# Patient Record
Sex: FEMALE | Race: BLACK OR AFRICAN AMERICAN | Employment: FULL TIME | URBAN - METROPOLITAN AREA
[De-identification: names, ages, dates, MRNs, and addresses within clinical notes are randomized per-mention and may not be internally consistent; named-entity substitution may affect disease eponyms.]

---

## 2022-10-27 ENCOUNTER — APPOINTMENT (OUTPATIENT)
Dept: GENERAL RADIOLOGY | Facility: HOSPITAL | Age: 22
End: 2022-10-27
Attending: NURSE PRACTITIONER
Payer: MEDICAID

## 2022-10-27 ENCOUNTER — HOSPITAL ENCOUNTER (EMERGENCY)
Facility: HOSPITAL | Age: 22
Discharge: HOME | End: 2022-10-28
Payer: MEDICAID

## 2022-10-27 VITALS
RESPIRATION RATE: 18 BRPM | DIASTOLIC BLOOD PRESSURE: 88 MMHG | TEMPERATURE: 98 F | HEIGHT: 62 IN | BODY MASS INDEX: 42.33 KG/M2 | WEIGHT: 230 LBS | OXYGEN SATURATION: 100 % | HEART RATE: 86 BPM | SYSTOLIC BLOOD PRESSURE: 151 MMHG

## 2022-10-27 DIAGNOSIS — S70.02XA CONTUSION OF LEFT HIP, INITIAL ENCOUNTER: Primary | ICD-10-CM

## 2022-10-27 PROCEDURE — 99283 EMERGENCY DEPT VISIT LOW MDM: CPT

## 2022-10-27 PROCEDURE — 73502 X-RAY EXAM HIP UNI 2-3 VIEWS: CPT | Performed by: NURSE PRACTITIONER

## 2022-10-27 PROCEDURE — 99284 EMERGENCY DEPT VISIT MOD MDM: CPT

## 2022-10-28 RX ORDER — TRAMADOL HYDROCHLORIDE 50 MG/1
TABLET ORAL EVERY 6 HOURS PRN
Qty: 10 TABLET | Refills: 0 | Status: SHIPPED | OUTPATIENT
Start: 2022-10-28 | End: 2022-11-02
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
## 2024-07-09 ENCOUNTER — HOSPITAL ENCOUNTER (EMERGENCY)
Facility: HOSPITAL | Age: 24
Discharge: HOME | End: 2024-07-09

## 2024-07-09 VITALS
RESPIRATION RATE: 16 BRPM | BODY MASS INDEX: 41 KG/M2 | SYSTOLIC BLOOD PRESSURE: 133 MMHG | HEART RATE: 70 BPM | OXYGEN SATURATION: 100 % | TEMPERATURE: 98 F | DIASTOLIC BLOOD PRESSURE: 66 MMHG | WEIGHT: 225 LBS

## 2024-07-09 DIAGNOSIS — R11.2 NAUSEA AND VOMITING IN ADULT: Primary | ICD-10-CM

## 2024-07-09 LAB
ALBUMIN SERPL-MCNC: 5.3 G/DL (ref 3.2–4.8)
ALBUMIN/GLOB SERPL: 1.4 {RATIO} (ref 1–2)
ALP LIVER SERPL-CCNC: 74 U/L
ALT SERPL-CCNC: 8 U/L
ANION GAP SERPL CALC-SCNC: 6 MMOL/L (ref 0–18)
AST SERPL-CCNC: 20 U/L (ref ?–34)
ATRIAL RATE: 59 BPM
B-HCG UR QL: NEGATIVE
BASOPHILS # BLD AUTO: 0.01 X10(3) UL (ref 0–0.2)
BASOPHILS NFR BLD AUTO: 0.1 %
BILIRUB SERPL-MCNC: 0.5 MG/DL (ref 0.3–1.2)
BUN BLD-MCNC: 8 MG/DL (ref 9–23)
BUN/CREAT SERPL: 11.8 (ref 10–20)
CALCIUM BLD-MCNC: 10.3 MG/DL (ref 8.7–10.4)
CHLORIDE SERPL-SCNC: 111 MMOL/L (ref 98–112)
CO2 SERPL-SCNC: 22 MMOL/L (ref 21–32)
CREAT BLD-MCNC: 0.68 MG/DL
DEPRECATED RDW RBC AUTO: 48.4 FL (ref 35.1–46.3)
EGFRCR SERPLBLD CKD-EPI 2021: 123 ML/MIN/1.73M2 (ref 60–?)
EOSINOPHIL # BLD AUTO: 0 X10(3) UL (ref 0–0.7)
EOSINOPHIL NFR BLD AUTO: 0 %
ERYTHROCYTE [DISTWIDTH] IN BLOOD BY AUTOMATED COUNT: 21.2 % (ref 11–15)
GLOBULIN PLAS-MCNC: 3.7 G/DL (ref 2–3.5)
GLUCOSE BLD-MCNC: 108 MG/DL (ref 70–99)
HCT VFR BLD AUTO: 25.8 %
HGB BLD-MCNC: 7.1 G/DL
IMM GRANULOCYTES # BLD AUTO: 0.04 X10(3) UL (ref 0–1)
IMM GRANULOCYTES NFR BLD: 0.5 %
LIPASE SERPL-CCNC: 25 U/L (ref 13–75)
LYMPHOCYTES # BLD AUTO: 0.59 X10(3) UL (ref 1–4)
LYMPHOCYTES NFR BLD AUTO: 8 %
MCH RBC QN AUTO: 17.7 PG (ref 26–34)
MCHC RBC AUTO-ENTMCNC: 27.5 G/DL (ref 31–37)
MCV RBC AUTO: 64.3 FL
MONOCYTES # BLD AUTO: 0.35 X10(3) UL (ref 0.1–1)
MONOCYTES NFR BLD AUTO: 4.7 %
NEUTROPHILS # BLD AUTO: 6.42 X10 (3) UL (ref 1.5–7.7)
NEUTROPHILS # BLD AUTO: 6.42 X10(3) UL (ref 1.5–7.7)
NEUTROPHILS NFR BLD AUTO: 86.7 %
OSMOLALITY SERPL CALC.SUM OF ELEC: 287 MOSM/KG (ref 275–295)
P AXIS: -4 DEGREES
P-R INTERVAL: 134 MS
PLATELET # BLD AUTO: 122 10(3)UL (ref 150–450)
PLATELETS.RETICULATED NFR BLD AUTO: 25.9 % (ref 0–7)
POTASSIUM SERPL-SCNC: 3.7 MMOL/L (ref 3.5–5.1)
PROT SERPL-MCNC: 9 G/DL (ref 5.7–8.2)
Q-T INTERVAL: 426 MS
QRS DURATION: 94 MS
QTC CALCULATION (BEZET): 421 MS
R AXIS: 44 DEGREES
RBC # BLD AUTO: 4.01 X10(6)UL
SODIUM SERPL-SCNC: 139 MMOL/L (ref 136–145)
T AXIS: 36 DEGREES
VENTRICULAR RATE: 59 BPM
WBC # BLD AUTO: 7.4 X10(3) UL (ref 4–11)

## 2024-07-09 PROCEDURE — 96374 THER/PROPH/DIAG INJ IV PUSH: CPT

## 2024-07-09 PROCEDURE — 83690 ASSAY OF LIPASE: CPT

## 2024-07-09 PROCEDURE — 85025 COMPLETE CBC W/AUTO DIFF WBC: CPT

## 2024-07-09 PROCEDURE — 96375 TX/PRO/DX INJ NEW DRUG ADDON: CPT

## 2024-07-09 PROCEDURE — 93010 ELECTROCARDIOGRAM REPORT: CPT

## 2024-07-09 PROCEDURE — 81025 URINE PREGNANCY TEST: CPT

## 2024-07-09 PROCEDURE — 96361 HYDRATE IV INFUSION ADD-ON: CPT

## 2024-07-09 PROCEDURE — 93005 ELECTROCARDIOGRAM TRACING: CPT

## 2024-07-09 PROCEDURE — 85060 BLOOD SMEAR INTERPRETATION: CPT

## 2024-07-09 PROCEDURE — 99284 EMERGENCY DEPT VISIT MOD MDM: CPT

## 2024-07-09 PROCEDURE — 96376 TX/PRO/DX INJ SAME DRUG ADON: CPT

## 2024-07-09 PROCEDURE — 80053 COMPREHEN METABOLIC PANEL: CPT

## 2024-07-09 RX ORDER — METOCLOPRAMIDE HYDROCHLORIDE 5 MG/ML
10 INJECTION INTRAMUSCULAR; INTRAVENOUS ONCE
Status: COMPLETED | OUTPATIENT
Start: 2024-07-09 | End: 2024-07-09

## 2024-07-09 RX ORDER — FAMOTIDINE 20 MG/1
20 TABLET, FILM COATED ORAL 2 TIMES DAILY PRN
Qty: 30 TABLET | Refills: 0 | Status: SHIPPED | OUTPATIENT
Start: 2024-07-09 | End: 2024-08-08

## 2024-07-09 RX ORDER — ONDANSETRON 4 MG/1
4 TABLET, ORALLY DISINTEGRATING ORAL EVERY 4 HOURS PRN
Qty: 10 TABLET | Refills: 0 | Status: SHIPPED | OUTPATIENT
Start: 2024-07-09 | End: 2024-07-16

## 2024-07-09 RX ORDER — ONDANSETRON 2 MG/ML
4 INJECTION INTRAMUSCULAR; INTRAVENOUS ONCE
Status: COMPLETED | OUTPATIENT
Start: 2024-07-09 | End: 2024-07-09
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
## 2024-07-09 NOTE — DISCHARGE INSTRUCTIONS
You will need to start taking your iron and call your doctor for a recheck of your complete blood count

## 2024-11-02 ENCOUNTER — APPOINTMENT (OUTPATIENT)
Dept: ULTRASOUND IMAGING | Facility: HOSPITAL | Age: 24
End: 2024-11-02
Attending: EMERGENCY MEDICINE

## 2024-11-02 ENCOUNTER — HOSPITAL ENCOUNTER (EMERGENCY)
Facility: HOSPITAL | Age: 24
Discharge: HOME | End: 2024-11-02
Attending: EMERGENCY MEDICINE

## 2024-11-02 VITALS
SYSTOLIC BLOOD PRESSURE: 113 MMHG | BODY MASS INDEX: 42.29 KG/M2 | WEIGHT: 224 LBS | TEMPERATURE: 99 F | HEART RATE: 93 BPM | DIASTOLIC BLOOD PRESSURE: 64 MMHG | RESPIRATION RATE: 16 BRPM | HEIGHT: 61 IN | OXYGEN SATURATION: 100 %

## 2024-11-02 DIAGNOSIS — Z3A.01 LESS THAN 8 WEEKS GESTATION OF PREGNANCY (HCC): ICD-10-CM

## 2024-11-02 DIAGNOSIS — D64.9 ANEMIA, UNSPECIFIED TYPE: Primary | ICD-10-CM

## 2024-11-02 DIAGNOSIS — D69.6 THROMBOCYTOPENIA (HCC): ICD-10-CM

## 2024-11-02 LAB
ALBUMIN SERPL-MCNC: 4.4 G/DL (ref 3.2–4.8)
ALBUMIN/GLOB SERPL: 1.5 {RATIO} (ref 1–2)
ALP LIVER SERPL-CCNC: 62 U/L
ALT SERPL-CCNC: 12 U/L
ANION GAP SERPL CALC-SCNC: 7 MMOL/L (ref 0–18)
ANTIBODY SCREEN: NEGATIVE
AST SERPL-CCNC: 17 U/L (ref ?–34)
B-HCG SERPL-ACNC: ABNORMAL MIU/ML
B-HCG UR QL: POSITIVE
BASOPHILS # BLD AUTO: 0.03 X10(3) UL (ref 0–0.2)
BASOPHILS NFR BLD AUTO: 0.5 %
BILIRUB SERPL-MCNC: 0.3 MG/DL (ref 0.3–1.2)
BILIRUB UR QL: NEGATIVE
BUN BLD-MCNC: 8 MG/DL (ref 9–23)
BUN/CREAT SERPL: 13.1 (ref 10–20)
CALCIUM BLD-MCNC: 9.4 MG/DL (ref 8.7–10.4)
CHLORIDE SERPL-SCNC: 107 MMOL/L (ref 98–112)
CO2 SERPL-SCNC: 24 MMOL/L (ref 21–32)
COLOR UR: YELLOW
CREAT BLD-MCNC: 0.61 MG/DL
DEPRECATED HBV CORE AB SER IA-ACNC: 2 NG/ML
DEPRECATED RDW RBC AUTO: 43.2 FL (ref 35.1–46.3)
EGFRCR SERPLBLD CKD-EPI 2021: 126 ML/MIN/1.73M2 (ref 60–?)
EOSINOPHIL # BLD AUTO: 0.18 X10(3) UL (ref 0–0.7)
EOSINOPHIL NFR BLD AUTO: 3.3 %
ERYTHROCYTE [DISTWIDTH] IN BLOOD BY AUTOMATED COUNT: 19.1 % (ref 11–15)
FLUAV + FLUBV RNA SPEC NAA+PROBE: NEGATIVE
FLUAV + FLUBV RNA SPEC NAA+PROBE: NEGATIVE
GLOBULIN PLAS-MCNC: 3 G/DL (ref 2–3.5)
GLUCOSE BLD-MCNC: 97 MG/DL (ref 70–99)
GLUCOSE UR-MCNC: NORMAL MG/DL
HCT VFR BLD AUTO: 23.5 %
HGB BLD-MCNC: 6.2 G/DL
HGB UR QL STRIP.AUTO: NEGATIVE
IMM GRANULOCYTES # BLD AUTO: 0.02 X10(3) UL (ref 0–1)
IMM GRANULOCYTES NFR BLD: 0.4 %
IRON SATN MFR SERPL: 2 %
IRON SERPL-MCNC: 9 UG/DL
KETONES UR-MCNC: NEGATIVE MG/DL
LEUKOCYTE ESTERASE UR QL STRIP.AUTO: 500
LYMPHOCYTES # BLD AUTO: 1.57 X10(3) UL (ref 1–4)
LYMPHOCYTES NFR BLD AUTO: 28.6 %
MCH RBC QN AUTO: 16.8 PG (ref 26–34)
MCHC RBC AUTO-ENTMCNC: 26.4 G/DL (ref 31–37)
MCV RBC AUTO: 63.5 FL
MONOCYTES # BLD AUTO: 0.78 X10(3) UL (ref 0.1–1)
MONOCYTES NFR BLD AUTO: 14.2 %
NEUTROPHILS # BLD AUTO: 2.91 X10 (3) UL (ref 1.5–7.7)
NEUTROPHILS # BLD AUTO: 2.91 X10(3) UL (ref 1.5–7.7)
NEUTROPHILS NFR BLD AUTO: 53 %
NITRITE UR QL STRIP.AUTO: NEGATIVE
OSMOLALITY SERPL CALC.SUM OF ELEC: 284 MOSM/KG (ref 275–295)
PH UR: 6 [PH] (ref 5–8)
PLATELET # BLD AUTO: 105 10(3)UL (ref 150–450)
PLATELETS.RETICULATED NFR BLD AUTO: 19.4 % (ref 0–7)
POTASSIUM SERPL-SCNC: 4 MMOL/L (ref 3.5–5.1)
PROT SERPL-MCNC: 7.4 G/DL (ref 5.7–8.2)
PROT UR-MCNC: 30 MG/DL
RBC # BLD AUTO: 3.7 X10(6)UL
RH BLOOD TYPE: NEGATIVE
RH BLOOD TYPE: NEGATIVE
RSV RNA SPEC NAA+PROBE: NEGATIVE
SARS-COV-2 RNA RESP QL NAA+PROBE: NOT DETECTED
SODIUM SERPL-SCNC: 138 MMOL/L (ref 136–145)
SP GR UR STRIP: 1.02 (ref 1–1.03)
TIBC SERPL-MCNC: 599 UG/DL (ref 250–425)
TRANSFERRIN SERPL-MCNC: 402 MG/DL (ref 250–380)
UROBILINOGEN UR STRIP-ACNC: NORMAL
WBC # BLD AUTO: 5.5 X10(3) UL (ref 4–11)

## 2024-11-02 PROCEDURE — 85025 COMPLETE CBC W/AUTO DIFF WBC: CPT | Performed by: EMERGENCY MEDICINE

## 2024-11-02 PROCEDURE — 81001 URINALYSIS AUTO W/SCOPE: CPT | Performed by: EMERGENCY MEDICINE

## 2024-11-02 PROCEDURE — 99284 EMERGENCY DEPT VISIT MOD MDM: CPT

## 2024-11-02 PROCEDURE — 83540 ASSAY OF IRON: CPT | Performed by: EMERGENCY MEDICINE

## 2024-11-02 PROCEDURE — 86850 RBC ANTIBODY SCREEN: CPT | Performed by: EMERGENCY MEDICINE

## 2024-11-02 PROCEDURE — 82728 ASSAY OF FERRITIN: CPT | Performed by: EMERGENCY MEDICINE

## 2024-11-02 PROCEDURE — 86900 BLOOD TYPING SEROLOGIC ABO: CPT | Performed by: EMERGENCY MEDICINE

## 2024-11-02 PROCEDURE — 87086 URINE CULTURE/COLONY COUNT: CPT | Performed by: EMERGENCY MEDICINE

## 2024-11-02 PROCEDURE — 0241U SARS-COV-2/FLU A AND B/RSV BY PCR (GENEXPERT): CPT | Performed by: EMERGENCY MEDICINE

## 2024-11-02 PROCEDURE — 96360 HYDRATION IV INFUSION INIT: CPT

## 2024-11-02 PROCEDURE — 84702 CHORIONIC GONADOTROPIN TEST: CPT | Performed by: EMERGENCY MEDICINE

## 2024-11-02 PROCEDURE — 76817 TRANSVAGINAL US OBSTETRIC: CPT | Performed by: EMERGENCY MEDICINE

## 2024-11-02 PROCEDURE — 76801 OB US < 14 WKS SINGLE FETUS: CPT | Performed by: EMERGENCY MEDICINE

## 2024-11-02 PROCEDURE — 80053 COMPREHEN METABOLIC PANEL: CPT | Performed by: EMERGENCY MEDICINE

## 2024-11-02 PROCEDURE — 84466 ASSAY OF TRANSFERRIN: CPT | Performed by: EMERGENCY MEDICINE

## 2024-11-02 PROCEDURE — 85060 BLOOD SMEAR INTERPRETATION: CPT | Performed by: EMERGENCY MEDICINE

## 2024-11-02 PROCEDURE — 86901 BLOOD TYPING SEROLOGIC RH(D): CPT | Performed by: EMERGENCY MEDICINE

## 2024-11-02 PROCEDURE — 81025 URINE PREGNANCY TEST: CPT

## 2024-11-02 RX ORDER — METOCLOPRAMIDE HYDROCHLORIDE 5 MG/ML
10 INJECTION INTRAMUSCULAR; INTRAVENOUS ONCE
Status: DISCONTINUED | OUTPATIENT
Start: 2024-11-02 | End: 2024-11-02

## 2024-11-02 RX ORDER — PRENATAL VIT/IRON FUM/FOLIC AC 27MG-0.8MG
1 TABLET ORAL DAILY
Qty: 30 TABLET | Refills: 0 | Status: SHIPPED | OUTPATIENT
Start: 2024-11-02 | End: 2024-12-02

## 2024-11-02 RX ORDER — FERROUS SULFATE 325(65) MG
325 TABLET, DELAYED RELEASE (ENTERIC COATED) ORAL 2 TIMES DAILY WITH MEALS
Qty: 180 TABLET | Refills: 0 | Status: SHIPPED | OUTPATIENT
Start: 2024-11-02 | End: 2025-01-31

## 2024-11-02 RX ORDER — DIPHENHYDRAMINE HYDROCHLORIDE 50 MG/ML
25 INJECTION INTRAMUSCULAR; INTRAVENOUS ONCE
Status: DISCONTINUED | OUTPATIENT
Start: 2024-11-02 | End: 2024-11-02

## 2024-11-02 RX ORDER — ACETAMINOPHEN 325 MG/1
650 TABLET ORAL ONCE
Status: COMPLETED | OUTPATIENT
Start: 2024-11-02 | End: 2024-11-02

## 2024-11-02 NOTE — ED PROVIDER NOTES
Patient Seen in: [facility] Emergency Department      History     Chief Complaint   Patient presents with    Abdomen/Flank Pain     Stated Complaint: abd pain    Subjective:   HPI      24-year-old female presents for evaluation for headache, congestion, dry mouth for the past two weeks as well as lower abdominal cramping radiating to the back for the past two weeks.  She is unsure when her last menstrual cycle was.  She does have mild cough. Headache was not thunderclap in nature.  It is frontal pressure like, made worse with bright lights and looking at computer monitors at work.  She denies fevers, chills, nausea, vomiting, diarrhea, constipation, chest pain, shortness of breath, vaginal bleeding or discharge, dysuria or hematuria.      Objective:     History reviewed. No pertinent past medical history.           History reviewed. No pertinent surgical history.             Social History     Socioeconomic History    Marital status: Single   Tobacco Use    Smoking status: Never    Smokeless tobacco: Never   Vaping Use    Vaping status: Never Used   Substance and Sexual Activity    Alcohol use: Yes     Comment: social    Drug use: Never                  Physical Exam     ED Triage Vitals [11/02/24 0937]   /60   Pulse 98   Resp 16   Temp 98.6 °F (37 °C)   Temp src    SpO2 95 %   O2 Device None (Room air)       Current Vitals:   Vital Signs  BP: 113/64  Pulse: 93  Resp: 16  Temp: 98.6 °F (37 °C)  MAP (mmHg): 79    Oxygen Therapy  SpO2: 100 %  O2 Device: None (Room air)        Physical Exam  Vitals and nursing note reviewed.   Constitutional:       General: She is not in acute distress.     Appearance: Normal appearance.   HENT:      Head: Normocephalic and atraumatic.      Jaw: No trismus.      Right Ear: No mastoid tenderness.      Left Ear: No mastoid tenderness.      Nose: Congestion present. No nasal deformity.      Right Nostril: No epistaxis.      Left Nostril: No epistaxis.      Mouth/Throat:       Lips: Pink.      Mouth: Mucous membranes are moist. No angioedema.      Pharynx: Oropharynx is clear. Uvula midline. No oropharyngeal exudate, posterior oropharyngeal erythema or uvula swelling.      Tonsils: No tonsillar exudate or tonsillar abscesses.   Eyes:      General: Lids are normal.      Extraocular Movements: Extraocular movements intact.      Conjunctiva/sclera: Conjunctivae normal.      Pupils: Pupils are equal, round, and reactive to light.   Cardiovascular:      Rate and Rhythm: Normal rate and regular rhythm.      Pulses: Normal pulses.      Heart sounds: Normal heart sounds.   Pulmonary:      Effort: Pulmonary effort is normal. No respiratory distress.      Breath sounds: Normal breath sounds and air entry.   Abdominal:      General: Bowel sounds are normal.      Palpations: Abdomen is soft.      Tenderness: There is no abdominal tenderness. There is no guarding or rebound.   Musculoskeletal:         General: No deformity. Normal range of motion.      Cervical back: Normal range of motion. No rigidity.   Skin:     General: Skin is warm and dry.      Coloration: Skin is not cyanotic.   Neurological:      General: No focal deficit present.      Mental Status: She is alert. Mental status is at baseline.      Motor: Motor function is intact.      Gait: Gait is intact.   Psychiatric:         Attention and Perception: Attention normal.         Mood and Affect: Mood normal.         Speech: Speech normal.             ED Course     Labs Reviewed   URINALYSIS WITH CULTURE REFLEX - Abnormal; Notable for the following components:       Result Value    Clarity Urine Ex.Turbid (*)     Protein Urine 30 (*)     Leukocyte Esterase Urine 500 (*)     WBC Urine 6-10 (*)     RBC Urine 6-10 (*)     Squamous Epi. Cells Many (*)     All other components within normal limits   CBC WITH DIFFERENTIAL WITH PLATELET - Abnormal; Notable for the following components:    RBC 3.70 (*)     HGB 6.2 (*)     HCT 23.5 (*)     MCV 63.5  (*)     MCH 16.8 (*)     MCHC 26.4 (*)     RDW 19.1 (*)     .0 (*)     Immature Platelet Fraction 19.4 (*)     All other components within normal limits   COMP METABOLIC PANEL (14) - Abnormal; Notable for the following components:    BUN 8 (*)     All other components within normal limits   HCG, BETA SUBUNIT (QUANT PREGNANCY TEST) - Abnormal; Notable for the following components:    Hcg Quantitative 16,462.6 (*)     All other components within normal limits   IRON AND TIBC - Abnormal; Notable for the following components:    Iron 9 (*)     Transferrin 402 (*)     Total Iron Binding Capacity 599 (*)     % Saturation 2 (*)     All other components within normal limits   FERRITIN - Abnormal; Notable for the following components:    Ferritin 2 (*)     All other components within normal limits   RBC MORPHOLOGY SCAN - Abnormal; Notable for the following components:    RBC Morphology See morphology below (*)     Platelet Morphology See morphology below (*)     Giant platelets Few (*)     All other components within normal limits   POCT PREGNANCY URINE - Abnormal; Notable for the following components:    POCT Urine Pregnancy Positive (*)     All other components within normal limits   SARS-COV-2/FLU A AND B/RSV BY PCR ([employer]) - Normal    Narrative:     This test is intended for the qualitative detection and differentiation of SARS-CoV-2, influenza A, influenza B, and respiratory syncytial virus (RSV) viral RNA in nasopharyngeal or nares swabs from individuals suspected of respiratory viral infection consistent with COVID-19 by their healthcare provider. Signs and symptoms of respiratory viral infection due to SARS-CoV-2, influenza, and RSV can be similar.    Test performed using the Xpert Xpress SARS-CoV-2/FLU/RSV (real time RT-PCR)  assay on the GeneXpert instrument, [employer], [place] [address].   This test is being used under the Food and Drug Administration's Emergency Use Authorization.    The authorized  Fact Sheet for Healthcare Providers for this assay is available upon request from the laboratory.   SCAN SLIDE   MD BLOOD SMEAR CONSULT   TYPE AND SCREEN    Narrative:     The following orders were created for panel order Type and screen.  Procedure                               Abnormality         Status                     ---------                               -----------         ------                     ABORH (Blood Type)[[id number]]                               Final result               Antibody Screen[[id number]]                                  Final result                 Please view results for these tests on the individual orders.   ABORH (BLOOD TYPE)   ANTIBODY SCREEN   ABORH CONFIRMATION   RAINBOW DRAW LAVENDER   RAINBOW DRAW LIGHT GREEN   RAINBOW DRAW BLUE   URINE CULTURE, ROUTINE       ED Course as of 11/02/24 1545  ------------------------------------------------------------  Time: 11/02 1154  Value: US PREG 1ST TRIM W/EV (CPT=76801/94229)  Comment: Per my independent interpretation, patient's US Pregnancy demonstrates intrauterine gestation.                MDM              Medical Decision Making  Differential diagnosis includes but is not limited to sinusitis, viral syndrome, allergies, migraine, tension headache, etc    Headache was gradual in onset and not worst of life, I do not suspect subarachnoid hemorrhage at this time. There is no fever or meningismus to suggest meningitis. There are no neurologic deficits, I do not suspect any intracranial mass at this time.  Patient's pregnancy test was positive, given abdominal pain, US obtained and demonstrates live intrauterine gestation.  Patient's CBC shows anemia and thrombocytopenia which was discussed with patient and advised need for further workup to determine etiology.  She denies heavy menses, melena and hematochezia.   Blood transfusion also offered and declined, she would like to start iron supplementation instead.  Rx for iron and  prenatals provided with OB follow up recommended as well.  She is discharged home with return precautions.     Medical Record Review: I personally reviewed available prior medical records for any recent pertinent discharge summaries, testing, and procedures, and reviewed those reports.    Complicating factors: The patient  has no past medical history on file. and  has no past surgical history on file. that contribute to the medical complexity of this ED evaluation.     Clinical impression as well as any lab results and radiology findings were discussed with the patient and/or caregiver. I personally reviewed all laboratory results and radiology images myself. Patient is advised to follow up with PCP for reevaluation. I provided discharge instructions and return precautions. Patient and/or caregiver voices understanding and agreement with the treatment plan. All questions were addressed and answered.             Problems Addressed:  Anemia, unspecified type: chronic illness or injury with severe exacerbation, progression, or side effects of treatment  Less than 8 weeks gestation of pregnancy (HCC): acute illness or injury  Thrombocytopenia (HCC): undiagnosed new problem with uncertain prognosis    Amount and/or Complexity of Data Reviewed  External Data Reviewed: labs.     Details: Labs on 7/9/24 reviewed, Hgb 7.1, Plt 122  Labs: ordered. Decision-making details documented in ED Course.  Radiology: ordered and independent interpretation performed. Decision-making details documented in ED Course.    Risk  Prescription drug management.        Disposition and Plan     Clinical Impression:  1. Anemia, unspecified type    2. Thrombocytopenia (HCC)    3. Less than 8 weeks gestation of pregnancy (HCC)         Disposition:  Discharge  11/2/2024 11:52 am    Follow-up:  Nancy Oneill MD  [address] [address]  [phone]    Call  To schedule follow up and re-evaluation          Medications  Prescribed:  Discharge Medication List as of 11/2/2024 11:56 AM        START taking these medications    Details   ferrous sulfate 325 (65 FE) MG Oral Tab EC Take 1 tablet (325 mg total) by mouth 2 (two) times daily with meals., Normal, Disp-180 tablet, R-0      Prenatal 27-0.8 MG Oral Tab Take 1 tablet by mouth daily., Normal, Disp-30 tablet, R-0                 Supplementary Documentation:

## 2024-11-02 NOTE — ED INITIAL ASSESSMENT (HPI)
To ED with c/o abd pain and lower back pain for about 2 weeks. Denies N/V/D. Patient also c/o migraine and cotton mouth.

## 2024-12-18 ENCOUNTER — TELEPHONE (OUTPATIENT)
Dept: OBGYN CLINIC | Facility: CLINIC | Age: 24
End: 2024-12-18

## 2024-12-18 NOTE — TELEPHONE ENCOUNTER
Patient calling with +HPT. LMP was 9/20/24. Cycles are regular, 28d.     No prenatal care so far, but seen in ER.   11/2/24 ultrasound dating 7w0d, making her 13w4d today.     Patient agrees to policy to rotate care between all providers in the office, and understands that on-call provider will deliver her.   Patient directed to start PNV with iron, DHA and folic acid.     HT 5'1\"   lb    OBN appt scheduled on 1/11/25. She is going to consider seeking care sooner elsewhere, but will let us know if she would like to cancel OB Nurse Education.

## 2024-12-26 ENCOUNTER — TELEPHONE (OUTPATIENT)
Dept: OBGYN CLINIC | Facility: CLINIC | Age: 24
End: 2024-12-26

## 2024-12-26 NOTE — TELEPHONE ENCOUNTER
Lmp 9/20/24 patient concerned in delay with [facility] group scheduling nurse Ed on 1/11.  Patient hoping to get in sooner and get ultrasound scheduled.    Pls advise

## 2024-12-30 ENCOUNTER — INITIAL PRENATAL (OUTPATIENT)
Dept: OBGYN CLINIC | Facility: CLINIC | Age: 24
End: 2024-12-30

## 2024-12-30 VITALS
WEIGHT: 226 LBS | BODY MASS INDEX: 43 KG/M2 | DIASTOLIC BLOOD PRESSURE: 70 MMHG | SYSTOLIC BLOOD PRESSURE: 118 MMHG | HEART RATE: 96 BPM

## 2024-12-30 DIAGNOSIS — Z34.92 NORMAL PREGNANCY IN SECOND TRIMESTER (HCC): Primary | ICD-10-CM

## 2024-12-30 PROCEDURE — 0500F INITIAL PRENATAL CARE VISIT: CPT | Performed by: OBSTETRICS & GYNECOLOGY

## 2024-12-30 RX ORDER — CALCIUM CARBONATE 500(1250)
1000 TABLET ORAL DAILY
Qty: 120 TABLET | Refills: 2 | Status: SHIPPED | OUTPATIENT
Start: 2024-12-30 | End: 2025-02-28

## 2024-12-30 RX ORDER — CHOLECALCIFEROL (VITAMIN D3) 25 MCG
1 TABLET,CHEWABLE ORAL DAILY
COMMUNITY

## 2024-12-30 RX ORDER — FOLIC ACID 0.4 MG
400 TABLET ORAL DAILY
COMMUNITY

## 2024-12-30 RX ORDER — PNV NO.95/FERROUS FUM/FOLIC AC 28MG-0.8MG
1 TABLET ORAL DAILY
Qty: 90 TABLET | Refills: 2 | Status: SHIPPED | OUTPATIENT
Start: 2024-12-30 | End: 2025-03-30

## 2024-12-30 RX ORDER — MULTIVIT-MIN/IRON/FOLIC ACID/K 18-600-40
1 CAPSULE ORAL DAILY
Qty: 90 CAPSULE | Refills: 2 | Status: SHIPPED | OUTPATIENT
Start: 2024-12-30 | End: 2025-09-26

## 2024-12-30 NOTE — PROGRESS NOTES
Mesha Cavanaugh is a 24 year old female  No LMP recorded (lmp unknown). Patient is pregnant.   Chief Complaint   Patient presents with    Prenatal Care     New OB       OBSTETRICS HISTORY:     OB History    Para Term  AB Living   1             SAB IAB Ectopic Multiple Live Births                  # Outcome Date GA Lbr Gerardo/2nd Weight Sex Type Anes PTL Lv   1 Current                GYNE HISTORY:         No data recorded       No data to display                  MEDICAL HISTORY:     History reviewed. No pertinent past medical history.    SURGICAL HISTORY:     Past Surgical History:   Procedure Laterality Date    Hip surgery         SOCIAL HISTORY:     Social History     Socioeconomic History    Marital status: Single   Tobacco Use    Smoking status: Never    Smokeless tobacco: Never   Vaping Use    Vaping status: Never Used   Substance and Sexual Activity    Alcohol use: Not Currently     Comment: social    Drug use: Never        FAMILY HISTORY:     History reviewed. No pertinent family history.    MEDICATIONS:       Current Outpatient Medications:     folic acid 400 MCG Oral Tab, Take 1 tablet (400 mcg total) by mouth daily., Disp: , Rfl:     prenatal vitamin with DHA 27-0.8-228 MG Oral Cap, Take 1 capsule by mouth daily., Disp: , Rfl:     Prenatal Vit-Fe Fumarate-FA (PRENATAL VITAMINS) 28-0.8 MG Oral Tab, Take 1 tablet by mouth daily., Disp: 90 tablet, Rfl: 2    Calcium 500 MG Oral Tab, Take 1,000 mg by mouth daily for 60 doses., Disp: 120 tablet, Rfl: 2    Cholecalciferol (VITAMIN D) 50 MCG (2000 UT) Oral Cap, Take 1 capsule (2,000 Units total) by mouth daily., Disp: 90 capsule, Rfl: 2    Aspirin 81 MG Oral Cap, Take 2 tablets by mouth daily., Disp: 90 capsule, Rfl: 2    ferrous sulfate 325 (65 FE) MG Oral Tab EC, Take 1 tablet (325 mg total) by mouth 2 (two) times daily with meals. (Patient not taking: Reported on 2024), Disp: 180 tablet, Rfl: 0    ALLERGIES:      Allergies[1]      REVIEW OF SYSTEMS:     Constitutional:    denies fever / chills  Eyes:     denies blurred or double vision  Cardiovascular:  denies chest pain or palpitations  Respiratory:    denies shortness of breath  Gastrointestinal:  denies severe abdominal pain, frequent diarrhea or constipation, nausea / vomiting  Genitourinary:    denies dysuria, bothersome incontinence  Skin/Breast:   denies any breast pain, lumps, or discharge  Neurological:    denies frequent severe headaches  Psychiatric:   denies depression or anxiety, thoughts of harming self or others  Heme/Lymph:    denies easy bruising or bleeding      PHYSICAL EXAM:   Blood pressure 118/70, pulse 96, weight 226 lb (102.5 kg).  Constitutional:  well developed, well nourished  Head/Face:  normocephalic  Neck/Thyroid: thyroid symmetric, no thyromegaly, no nodules, no adenopathy  Lymphatic: no abnormal supraclavicular or axillary adenopathy is noted  Respiratory:      chest wall symmetric and nontender on palpation, clear to asculation bilateral, no wheezing, rales, ronchi, and resonance normal upon percussion  Cardiovascular: chest normal in appearance, regular rate and rhythm, no murmurs, PMI palpated midclavicular line  Breast:   normal bilaterally without palpable masses, tenderness, asymmetry, nipple discharge, nipple retraction or skin changes bilaterally  Abdomen:   soft, nontender, nondistended, no masses  Skin/Hair:  no unusual rashes or bruises  Extremities:  no edema, no cyanosis, non tender bilaterally  Psychiatric:   oriented to time, place, person and situation. Appropriate mood and affect    Pelvic Exam:  External Genitalia:  normal appearance, hair distribution, and no lesions  Urethral Meatus:   normal in size, location, without lesions   Bladder:    no fullness, masses or tenderness  Vagina:    normal appearance without lesions, no abnormal discharge  Cervix:    No lesions, normal friability   Uterus:    normal in size, 8 wk  sized, normal contour, position, mobility, without  motion tenderness  Adnexa:   normal without masses or tenderness  Perineum:   normal  Anus: no hemorroids         ASSESSMENT & PLAN:     Mesha was seen today for prenatal care.    Diagnoses and all orders for this visit:    Normal pregnancy in second trimester (HCC)  -     Prenatal Vit-Fe Fumarate-FA (PRENATAL VITAMINS) 28-0.8 MG Oral Tab; Take 1 tablet by mouth daily.  -     Calcium 500 MG Oral Tab; Take 1,000 mg by mouth daily for 60 doses.  -     Cholecalciferol (VITAMIN D) 50 MCG (2000 UT) Oral Cap; Take 1 capsule (2,000 Units total) by mouth daily.  -     Aspirin 81 MG Oral Cap; Take 2 tablets by mouth daily.  -     US PREG 2ND 3RD TRIMESTER (CPT=76805); Future  -     CBC W Differential W Platelet; Future  -     Rubella, IGG; Future  -     Antibody Screen; Future  -     T PALLIDUM SCREENING CASCADE; Future  -     Hepatitis B Surface Antigen; Future  -     Blood Type, ABO And Rh D; Future  -     Urine Culture, Routine; Future  -     HIV AG AB Combo; Future  -     HCV Antibody; Future  -     Glucose Tolerance, 50 gm (1 hr), Gestational (ADA); Future  -     [id number] PLUS+SCA; Future  -     AFP, Maternal Serum; Future  -     ThinPrep Pap with HPV Reflex, Chlamydia/GC; Future  -     Chlamydia/Gc Amplification; Future      History and physical exam have been performed.  If you ever need to reach a provider please call the office phone number.  After office hours there is always somebody on call.  Reasons to call the provider discussed with patient.  Prenatal vitamins have been prescribed. The prenatal vitamin has iron and folic acid which helps to prevent iron deficiency anemia and neural tube defects.  Additional iron has been prescribed and should be taken every other day.  Vitamin D supplementation 3482-4296 Iunits daily can be given for pregnant patients whose children are deemed at high risk of asthma. (Patient or her  has asthma).  Vitamin B6 can  be prescribed if there is nausea or vomiting and is safe to use up to 3 times daily.  Antacids for reflux are safe.  Tylenol Cold daytime or Tylenol flu daytime are safe to use if there are upper respiratory symptoms.  Extra strength Tylenol can be used for persistent headaches and back pain but in a limited fashion.  Avoidance of nonsteroidals discussed with the patient.  A healthy diet is important and dietary counseling done with the patient..  I counseled that fruits, vegetables, legumes, fish, lean meats, chicken, turkey, nuts and seeds are advised.  Fish to avoid are Jed Mackerel, Mccurtain, Orange roughy, Shark, Swordfish, Tilefish, Bigeye tuna. Canned light tuna (including skipjack) is a good choice.  Please avoid fried and greasy foods.  Please avoid caffeine, alcohol, THC.  I recommend calcium supplementation 500 mg daily and Vitamin D 1,000 mg daily that the patient can obtain over the counter.  Exercise is encouraged and is okay for 30 minutes daily 5 to 7 days/week.  Moderate intensity exercise (able to carry on a normal conversation during exercise) is advised.  Strength training is allowed in a safe manner as to not cause back injury.  Sexual intercourse is allowed if there is no vaginal bleeding . Hot tubs and saunas should be avoided during the first trimester.  Swimming is okay to participate.  The patient should be up-to-date regarding COVID-19 vaccination and the influenza vaccine is recommended during influenza season.   Pregnancy risk factors were reviewed with the patient and prenatal visit frequency and potential timing of future ultrasounds and fetal monitoring if needed were discussed with the patient.  I reviewed the above with the patient and spent approx 32 minutes face to face consultation.       FOLLOW-UP     No follow-ups on file.      Ezequiel Yeh MD  12/30/2024         [1]   Allergies  Allergen Reactions    Latex HIVES

## 2024-12-31 LAB
C TRACH DNA SPEC QL NAA+PROBE: NEGATIVE
N GONORRHOEA DNA SPEC QL NAA+PROBE: NEGATIVE

## 2025-01-03 ENCOUNTER — LAB ENCOUNTER (OUTPATIENT)
Dept: LAB | Facility: HOSPITAL | Age: 25
End: 2025-01-03
Attending: OBSTETRICS & GYNECOLOGY
Payer: MEDICAID

## 2025-01-03 DIAGNOSIS — Z34.92 NORMAL PREGNANCY IN SECOND TRIMESTER (HCC): ICD-10-CM

## 2025-01-03 LAB
ANTIBODY SCREEN: NEGATIVE
BASOPHILS # BLD AUTO: 0.01 X10(3) UL (ref 0–0.2)
BASOPHILS NFR BLD AUTO: 0.2 %
DEPRECATED RDW RBC AUTO: 53.1 FL (ref 35.1–46.3)
EOSINOPHIL # BLD AUTO: 0.09 X10(3) UL (ref 0–0.7)
EOSINOPHIL NFR BLD AUTO: 2 %
ERYTHROCYTE [DISTWIDTH] IN BLOOD BY AUTOMATED COUNT: 20.2 % (ref 11–15)
HBV SURFACE AG SER-ACNC: 0.12 [IU]/L
HBV SURFACE AG SERPL QL IA: NONREACTIVE
HCT VFR BLD AUTO: 31.3 %
HCV AB SERPL QL IA: NONREACTIVE
HGB BLD-MCNC: 9.5 G/DL
IMM GRANULOCYTES # BLD AUTO: 0.01 X10(3) UL (ref 0–1)
IMM GRANULOCYTES NFR BLD: 0.2 %
LYMPHOCYTES # BLD AUTO: 1.2 X10(3) UL (ref 1–4)
LYMPHOCYTES NFR BLD AUTO: 26.8 %
MCH RBC QN AUTO: 22 PG (ref 26–34)
MCHC RBC AUTO-ENTMCNC: 30.4 G/DL (ref 31–37)
MCV RBC AUTO: 72.5 FL
MONOCYTES # BLD AUTO: 0.69 X10(3) UL (ref 0.1–1)
MONOCYTES NFR BLD AUTO: 15.4 %
NEUTROPHILS # BLD AUTO: 2.47 X10 (3) UL (ref 1.5–7.7)
NEUTROPHILS # BLD AUTO: 2.47 X10(3) UL (ref 1.5–7.7)
NEUTROPHILS NFR BLD AUTO: 55.4 %
PLATELET # BLD AUTO: 87 10(3)UL (ref 150–450)
PLATELETS.RETICULATED NFR BLD AUTO: 22.1 % (ref 0–7)
RBC # BLD AUTO: 4.32 X10(6)UL
RH BLOOD TYPE: NEGATIVE
RUBV IGG SER QL: POSITIVE
RUBV IGG SER-ACNC: 157 IU/ML (ref 10–?)
T PALLIDUM AB SER QL IA: NONREACTIVE
WBC # BLD AUTO: 4.5 X10(3) UL (ref 4–11)

## 2025-01-03 PROCEDURE — 86803 HEPATITIS C AB TEST: CPT

## 2025-01-03 PROCEDURE — 86900 BLOOD TYPING SEROLOGIC ABO: CPT

## 2025-01-03 PROCEDURE — 36415 COLL VENOUS BLD VENIPUNCTURE: CPT

## 2025-01-03 PROCEDURE — 87340 HEPATITIS B SURFACE AG IA: CPT

## 2025-01-03 PROCEDURE — 86850 RBC ANTIBODY SCREEN: CPT

## 2025-01-03 PROCEDURE — 86762 RUBELLA ANTIBODY: CPT

## 2025-01-03 PROCEDURE — 86780 TREPONEMA PALLIDUM: CPT

## 2025-01-03 PROCEDURE — 87389 HIV-1 AG W/HIV-1&-2 AB AG IA: CPT

## 2025-01-03 PROCEDURE — 85025 COMPLETE CBC W/AUTO DIFF WBC: CPT

## 2025-01-03 PROCEDURE — 87086 URINE CULTURE/COLONY COUNT: CPT

## 2025-01-03 PROCEDURE — 82105 ALPHA-FETOPROTEIN SERUM: CPT

## 2025-01-03 PROCEDURE — 86901 BLOOD TYPING SEROLOGIC RH(D): CPT

## 2025-01-04 LAB
AFP MOM: 0.98
AFP VALUE: 27.5 NG/ML
GA ON COLL DATE: 15.9 WEEKS
INSULIN DEP AFP: NO
MAT AGE AT EDD: 27.1 YR
MULTIPLE GEST AFP: NO
OSBR RISK 1 IN AFP: NORMAL
WEIGHT AFP: 226 LBS

## 2025-01-07 PROBLEM — O26.892 RH NEGATIVE STATE IN ANTEPARTUM PERIOD, SECOND TRIMESTER (HCC): Status: ACTIVE | Noted: 2025-01-07

## 2025-01-07 PROBLEM — Z67.91 RH NEGATIVE STATE IN ANTEPARTUM PERIOD, SECOND TRIMESTER (HCC): Status: ACTIVE | Noted: 2025-01-07

## 2025-01-09 LAB
GESTATIONAL AGE > OR = 9W:: YES
MONOSOMY X (TURNER SYNDROME): NOT DETECTED
TEST RESULT: NEGATIVE
TRISOMY 13 (PATAU SYNDROME): NEGATIVE
TRISOMY 18 (EDWARDS SYNDROME): NEGATIVE
TRISOMY 21 (DOWN SYNDROME): NEGATIVE
XXX (TRIPLE X SYNDROME): NOT DETECTED
XXY (KLINEFELTER SYNDROME): NOT DETECTED
XYY (JACOBS SYNDROME): NOT DETECTED

## 2025-01-18 ENCOUNTER — HOSPITAL ENCOUNTER (EMERGENCY)
Facility: HOSPITAL | Age: 25
Discharge: LEFT BEFORE DISCHARGE | End: 2025-01-18
Payer: MEDICAID

## 2025-01-18 VITALS
HEART RATE: 96 BPM | DIASTOLIC BLOOD PRESSURE: 81 MMHG | HEIGHT: 61 IN | OXYGEN SATURATION: 100 % | BODY MASS INDEX: 42.48 KG/M2 | TEMPERATURE: 98 F | RESPIRATION RATE: 18 BRPM | WEIGHT: 225 LBS | SYSTOLIC BLOOD PRESSURE: 113 MMHG

## 2025-01-18 NOTE — ED INITIAL ASSESSMENT (HPI)
Mesha arrives with complaints of vomiting since yesterday. States she went to work today and vomited all over her office. 18 weeks pregnant, follows OB Dr. Yeh.   Denies any pregnancy concerns at this time.

## 2025-01-28 ENCOUNTER — TELEPHONE (OUTPATIENT)
Dept: OBGYN CLINIC | Facility: CLINIC | Age: 25
End: 2025-01-28

## 2025-01-28 ENCOUNTER — ROUTINE PRENATAL (OUTPATIENT)
Dept: OBGYN CLINIC | Facility: CLINIC | Age: 25
End: 2025-01-28

## 2025-01-28 VITALS — BODY MASS INDEX: 43 KG/M2 | DIASTOLIC BLOOD PRESSURE: 73 MMHG | SYSTOLIC BLOOD PRESSURE: 117 MMHG | WEIGHT: 227.19 LBS

## 2025-01-28 DIAGNOSIS — O99.210 OBESITY IN PREGNANCY (HCC): Primary | ICD-10-CM

## 2025-01-28 PROCEDURE — 99213 OFFICE O/P EST LOW 20 MIN: CPT | Performed by: OBSTETRICS & GYNECOLOGY

## 2025-01-28 NOTE — TELEPHONE ENCOUNTER
Patient verified name and     Patient requesting letter for Monday to be excused from work due to flu like symptoms. States she spoke with a provider from our office and was told for her to take the day off. Patient was seen in office today and forgot to ask about the letter.    Informed patient, message will be sent to provider for approval. Voiced understanding and agreed.

## 2025-01-28 NOTE — TELEPHONE ENCOUNTER
Patient came in today for her OB appointment., need a doctors note to return back to work for tommorow.   Patient request the note be uploaded to [employer].

## 2025-01-28 NOTE — PROGRESS NOTES
Mesha Cavanaugh is a 24 year old female  No LMP recorded (lmp unknown). Patient is pregnant.   Chief Complaint   Patient presents with    Prenatal Care     Pt given number to call and schedule level 2 with [facility]. She verbalized understanding and had no further questions.     Pt calling [facility] now for level 2 ultrasound.  No c/o.   OBSTETRICS HISTORY:     OB History    Para Term  AB Living   1             SAB IAB Ectopic Multiple Live Births                  # Outcome Date GA Lbr Gerardo/2nd Weight Sex Type Anes PTL Lv   1 Current                GYNE HISTORY:         No data recorded      Latest Ref Rng & Units 2024    10:20 AM   RECENT PAP RESULTS   INTERPRETATION/RESULT: Negative for intraepithelial lesion or malignancy Negative for intraepithelial lesion or malignancy          MEDICAL HISTORY:     No past medical history on file.    SURGICAL HISTORY:     Past Surgical History:   Procedure Laterality Date    Hip surgery         SOCIAL HISTORY:     Social History     Socioeconomic History    Marital status: Single   Tobacco Use    Smoking status: Never    Smokeless tobacco: Never   Vaping Use    Vaping status: Never Used   Substance and Sexual Activity    Alcohol use: Not Currently     Comment: social    Drug use: Never        FAMILY HISTORY:     No family history on file.    MEDICATIONS:       Current Outpatient Medications:     folic acid 400 MCG Oral Tab, Take 1 tablet (400 mcg total) by mouth daily., Disp: , Rfl:     prenatal vitamin with DHA 27-0.8-228 MG Oral Cap, Take 1 capsule by mouth daily., Disp: , Rfl:     Prenatal Vit-Fe Fumarate-FA (PRENATAL VITAMINS) 28-0.8 MG Oral Tab, Take 1 tablet by mouth daily., Disp: 90 tablet, Rfl: 2    Calcium 500 MG Oral Tab, Take 1,000 mg by mouth daily for 60 doses., Disp: 120 tablet, Rfl: 2    Cholecalciferol (VITAMIN D) 50 MCG (2000 UT) Oral Cap, Take 1 capsule (2,000 Units total) by mouth daily., Disp: 90 capsule, Rfl: 2    Aspirin 81 MG Oral Cap,  Take 2 tablets by mouth daily., Disp: 90 capsule, Rfl: 2    ferrous sulfate 325 (65 FE) MG Oral Tab EC, Take 1 tablet (325 mg total) by mouth 2 (two) times daily with meals. (Patient not taking: Reported on 1/28/2025), Disp: 180 tablet, Rfl: 0    ALLERGIES:     Allergies[1]      REVIEW OF SYSTEMS:     Constitutional:    denies fever / chills  Cardiovascular:  denies chest pain or palpitations  Respiratory:    denies shortness of breath  Gastrointestinal:  denies severe abdominal pain, frequent diarrhea or constipation, nausea / vomiting  Genitourinary:    denies dysuria, bothersome incontinence  Skin/Breast:   denies any breast pain, lumps, or discharge  Neurological:    denies frequent severe headaches  Psychiatric:   denies depression or anxiety, thoughts of harming self or others      PHYSICAL EXAM:   Blood pressure 117/73, weight 227 lb 3.2 oz (103.1 kg).  Constitutional:  well developed, well nourished, no distress  Abdomen:   soft, gravid, nontender  Musculoskeletal: no cva tenderness bilaterally  Skin/Hair:  no unusual rashes or bruises  Extremities:  no edema, no cyanosis, non tender bilaterally  Psychiatric:   oriented to time, place, person and situation. Appropriate mood and affect      ASSESSMENT & PLAN:     Mesha was seen today for prenatal care.    Diagnoses and all orders for this visit:    Obesity in pregnancy (HCC)  -     Maternal Fetal Medicine Referral - [place] Location          FOLLOW-UP     No follow-ups on file.      Guerrero Guevara MD  1/28/2025         [1]   Allergies  Allergen Reactions    Latex HIVES

## 2025-02-12 ENCOUNTER — HOSPITAL ENCOUNTER (OUTPATIENT)
Dept: PERINATAL CARE | Facility: HOSPITAL | Age: 25
Discharge: HOME | End: 2025-02-12
Attending: OBSTETRICS & GYNECOLOGY
Payer: MEDICAID

## 2025-02-12 VITALS
HEART RATE: 96 BPM | SYSTOLIC BLOOD PRESSURE: 117 MMHG | WEIGHT: 239 LBS | DIASTOLIC BLOOD PRESSURE: 76 MMHG | BODY MASS INDEX: 45 KG/M2

## 2025-02-12 DIAGNOSIS — O99.212 MATERNAL MORBID OBESITY IN SECOND TRIMESTER, ANTEPARTUM (HCC): ICD-10-CM

## 2025-02-12 DIAGNOSIS — Z36.89 ENCOUNTER FOR FETAL ANATOMIC SURVEY (HCC): ICD-10-CM

## 2025-02-12 DIAGNOSIS — E66.01 MATERNAL MORBID OBESITY IN SECOND TRIMESTER, ANTEPARTUM (HCC): ICD-10-CM

## 2025-02-12 DIAGNOSIS — O99.210 OBESITY IN PREGNANCY (HCC): ICD-10-CM

## 2025-02-12 DIAGNOSIS — Z36.89 ENCOUNTER FOR FETAL ANATOMIC SURVEY (HCC): Primary | ICD-10-CM

## 2025-02-12 PROCEDURE — 76811 OB US DETAILED SNGL FETUS: CPT | Performed by: OBSTETRICS & GYNECOLOGY

## 2025-02-25 ENCOUNTER — TELEPHONE (OUTPATIENT)
Dept: OBGYN CLINIC | Facility: CLINIC | Age: 25
End: 2025-02-25

## 2025-02-25 ENCOUNTER — ROUTINE PRENATAL (OUTPATIENT)
Dept: OBGYN CLINIC | Facility: CLINIC | Age: 25
End: 2025-02-25

## 2025-02-25 VITALS — BODY MASS INDEX: 44 KG/M2 | SYSTOLIC BLOOD PRESSURE: 126 MMHG | WEIGHT: 234.81 LBS | DIASTOLIC BLOOD PRESSURE: 81 MMHG

## 2025-02-25 DIAGNOSIS — E66.01 MATERNAL MORBID OBESITY, ANTEPARTUM, SECOND TRIMESTER (HCC): Primary | ICD-10-CM

## 2025-02-25 DIAGNOSIS — Z98.890 HISTORY OF HIP SURGERY: ICD-10-CM

## 2025-02-25 DIAGNOSIS — O99.212 MATERNAL MORBID OBESITY, ANTEPARTUM, SECOND TRIMESTER (HCC): Primary | ICD-10-CM

## 2025-02-25 PROCEDURE — 99214 OFFICE O/P EST MOD 30 MIN: CPT | Performed by: OBSTETRICS & GYNECOLOGY

## 2025-02-25 NOTE — PROGRESS NOTES
Mesha Cavanaugh is a 24 year old female  No LMP recorded (lmp unknown). Patient is pregnant.   Chief Complaint   Patient presents with    Prenatal Care       OBSTETRICS HISTORY:     OB History    Para Term  AB Living   1             SAB IAB Ectopic Multiple Live Births                  # Outcome Date GA Lbr Gerardo/2nd Weight Sex Type Anes PTL Lv   1 Current                GYNE HISTORY:         No data recorded      Latest Ref Rng & Units 2024    10:20 AM   RECENT PAP RESULTS   INTERPRETATION/RESULT: Negative for intraepithelial lesion or malignancy Negative for intraepithelial lesion or malignancy          MEDICAL HISTORY:     History reviewed. No pertinent past medical history.    SURGICAL HISTORY:     Past Surgical History:   Procedure Laterality Date    Hip surgery         SOCIAL HISTORY:     Social History     Socioeconomic History    Marital status: Single   Tobacco Use    Smoking status: Never     Passive exposure: Never    Smokeless tobacco: Never   Vaping Use    Vaping status: Never Used   Substance and Sexual Activity    Alcohol use: Not Currently     Comment: social    Drug use: Never        FAMILY HISTORY:     History reviewed. No pertinent family history.    MEDICATIONS:       Current Outpatient Medications:     prenatal vitamin with DHA 27-0.8-228 MG Oral Cap, Take 1 capsule by mouth daily., Disp: , Rfl:     Prenatal Vit-Fe Fumarate-FA (PRENATAL VITAMINS) 28-0.8 MG Oral Tab, Take 1 tablet by mouth daily., Disp: 90 tablet, Rfl: 2    Cholecalciferol (VITAMIN D) 50 MCG (2000 UT) Oral Cap, Take 1 capsule (2,000 Units total) by mouth daily. (Patient not taking: Reported on 2025), Disp: 90 capsule, Rfl: 2    Aspirin 81 MG Oral Cap, Take 2 tablets by mouth daily. (Patient not taking: Reported on 2025), Disp: 90 capsule, Rfl: 2    ALLERGIES:     Allergies[1]      REVIEW OF SYSTEMS:     Constitutional:    denies fever / chills  Cardiovascular:  denies chest pain or  palpitations  Respiratory:    denies shortness of breath  Gastrointestinal:  denies severe abdominal pain, frequent diarrhea or constipation, nausea / vomiting  Genitourinary:    denies dysuria, bothersome incontinence  Skin/Breast:   denies any breast pain, lumps, or discharge  Neurological:    denies frequent severe headaches  Psychiatric:   denies depression or anxiety, thoughts of harming self or others      PHYSICAL EXAM:   Blood pressure 126/81, weight 234 lb 12.8 oz (106.5 kg).  Constitutional:  well developed, well nourished, no distress  Abdomen:   soft, gravid, nontender  Musculoskeletal: no cva tenderness bilaterally  Skin/Hair:  no unusual rashes or bruises  Extremities:  no edema, no cyanosis, non tender bilaterally  Psychiatric:   oriented to time, place, person and situation. Appropriate mood and affect      ASSESSMENT & PLAN:     Mesha was seen today for prenatal care.    Diagnoses and all orders for this visit:    Maternal morbid obesity, antepartum, second trimester (HCC)    History of hip surgery  -     MATERNAL FETAL MEDICINE - INTERNAL    Plan: mfm consult for hx hip surgery. Can she attempt vaginal trial. Evidence  She had surgery at age 10 after falling at [facility].   Also, needs 28 wk ultrasound appt  Prenatal checklist first trimester counseling has been completed.  I spent 35 minutes face-to-face with the patient, over half of the time in discussion and counseling of the below items  -Great expectations book was suggested as a good bleeding source  -I discussed with the patient that she is welcome to see any of our providers for prenatal visits and that we do have a physician assistant for office visits as well.  I discussed that the on-call provider will be assisting with the delivery  -I discussed that the HIV blood test is included in the routine prenatal initial lab profile and that I recommend an early 1 hour glucose tolerance test which will to be repeated in the third  trimester.  I discussed that the initial prenatal panel will include her blood type, CBC, hepatitis B, syphilis, hepatitis C, rubella status, HIV, and urine culture  -I discussed her risk factors for this pregnancy and how the pregnancy might be affected by these risks and how monitoring the fetus and ultrasounds might be needed as part of the treatment and we discussed the anticipated course of prenatal care  -Nutrition and weight gain counseling was completed  -Toxoplasmosis precautions (cats/raw meat) were given.  Prevention of infection is based on avoiding contaminated or undercooked meat products and contaminated unfiltered water.  If cats are in the home avoid accidental contact with cat feces through touching hands to mouth after handling cats or cleaning the litter box.  -Sexual activity is okay in the absence of vaginal bleeding or  labor.  -Exercise, HR <140, and low impact aerobics were advised as part of a healthy course  -Influenza vaccine was discussed with the patient if seasonally appropriate  -Oral health discussed and advised.  Prevention, diagnosis and treatment of oral conditions should not be deferred because of pregnancy.  X-rays with shielding of the abdomen and thyroid, local anesthesia, dental extraction should be completed if necessary and use of local anesthesia and oral short-term pain medicine can be prescribed.  -Environmental/work hazards discussed with the patient  -Travel discussed with the patient.  Air travel is permissible at less than 36 weeks domestically and 35 weeks internationally and pregnancy risk factors be taken into consideration  -Alcohol counseling given  -Illicit/recreational drugs, random urine tox screens discussed  -Use of any medications (including supplements, vitamins, herbs,OTC drugs)  -Smoking counseling   -Domestic violence screening completed  -Seatbelt use advised.  3 point seatbelts I advised.  The lap belt should be placed below the uterus and the  shoulder belt above and lateral to the uterus  -Childbirth classes/hospital facilities/tours discussed with patient  -Info for cord blood collection/tissue collection given  -Cystic fibrosis screening, spinal muscular atrophy screening, and Materna 21 testing discussed with the patient.  Need for alpha-fetoprotein between 15 and 22 weeks discussed with the patient  - consent form given if the patient is attempting a vaginal trial after , goal to obtain old  report discussed with the patient      FOLLOW-UP     No follow-ups on file.      Ezequiel Yeh MD  2025         [1]   Allergies  Allergen Reactions    Latex HIVES

## 2025-03-08 ENCOUNTER — LAB ENCOUNTER (OUTPATIENT)
Dept: LAB | Facility: HOSPITAL | Age: 25
End: 2025-03-08
Attending: OBSTETRICS & GYNECOLOGY
Payer: MEDICAID

## 2025-03-08 DIAGNOSIS — Z34.92 NORMAL PREGNANCY IN SECOND TRIMESTER (HCC): ICD-10-CM

## 2025-03-08 LAB — GLUCOSE 1H P GLC SERPL-MCNC: 73 MG/DL

## 2025-03-08 PROCEDURE — 36415 COLL VENOUS BLD VENIPUNCTURE: CPT

## 2025-03-08 PROCEDURE — 82950 GLUCOSE TEST: CPT

## 2025-03-12 ENCOUNTER — TELEPHONE (OUTPATIENT)
Age: 25
End: 2025-03-12

## 2025-03-12 PROBLEM — O99.012 ANEMIA COMPLICATING PREGNANCY IN SECOND TRIMESTER (HCC): Status: ACTIVE | Noted: 2025-03-12

## 2025-03-12 NOTE — TELEPHONE ENCOUNTER
Patient called in to schedule iron infusion informed her that we have not received a order as of yet. Informed her I am showing that they noted it was faxed over on 2-25 but we still have not received it.

## 2025-03-13 ENCOUNTER — TELEPHONE (OUTPATIENT)
Age: 25
End: 2025-03-13

## 2025-03-21 ENCOUNTER — OFFICE VISIT (OUTPATIENT)
Age: 25
End: 2025-03-21
Attending: OBSTETRICS & GYNECOLOGY
Payer: MEDICAID

## 2025-03-21 VITALS
BODY MASS INDEX: 47 KG/M2 | OXYGEN SATURATION: 99 % | SYSTOLIC BLOOD PRESSURE: 115 MMHG | WEIGHT: 248.69 LBS | HEART RATE: 94 BPM | RESPIRATION RATE: 18 BRPM | TEMPERATURE: 98 F | DIASTOLIC BLOOD PRESSURE: 72 MMHG

## 2025-03-21 DIAGNOSIS — O99.012 ANEMIA COMPLICATING PREGNANCY IN SECOND TRIMESTER (HCC): Primary | ICD-10-CM

## 2025-03-21 NOTE — PROGRESS NOTES
Patient arrives to infusion for Venofer . Patient denies any issues or concerns.      Ordering Provider: Ezequiel Yeh MD  Order Exp: 2/3  doses remaining in order     Patient appeared to tolerate infusion without difficulty or complaint. No s/s of adverse reaction noted. VSS post infusion.     Reviewed next apt date/time: 3/28 at 2pm    Education Record  Learner:  Patient  Disease / Diagnosis: Anemia in pregnancy  Barriers / Limitations:  None  Method:  Discussion, Printed material, and Reinforcement  General Topics:  Medication, Precautions, Side effects and symptom management, and Plan of care reviewed  Outcome:  Shows understanding

## 2025-03-25 ENCOUNTER — ROUTINE PRENATAL (OUTPATIENT)
Dept: OBGYN CLINIC | Facility: CLINIC | Age: 25
End: 2025-03-25

## 2025-03-25 VITALS — WEIGHT: 248 LBS | DIASTOLIC BLOOD PRESSURE: 76 MMHG | SYSTOLIC BLOOD PRESSURE: 128 MMHG | BODY MASS INDEX: 47 KG/M2

## 2025-03-25 DIAGNOSIS — E66.01 MATERNAL MORBID OBESITY, ANTEPARTUM, SECOND TRIMESTER (HCC): Primary | ICD-10-CM

## 2025-03-25 DIAGNOSIS — J06.9 ACUTE UPPER RESPIRATORY INFECTION: ICD-10-CM

## 2025-03-25 DIAGNOSIS — O99.212 MATERNAL MORBID OBESITY, ANTEPARTUM, SECOND TRIMESTER (HCC): Primary | ICD-10-CM

## 2025-03-25 PROCEDURE — 99214 OFFICE O/P EST MOD 30 MIN: CPT | Performed by: OBSTETRICS & GYNECOLOGY

## 2025-03-25 RX ORDER — PNV NO.95/FERROUS FUM/FOLIC AC 28MG-0.8MG
1 TABLET ORAL DAILY
Qty: 90 TABLET | Refills: 2 | Status: SHIPPED | OUTPATIENT
Start: 2025-03-25 | End: 2025-06-23

## 2025-03-25 RX ORDER — AZITHROMYCIN 500 MG/1
500 TABLET, FILM COATED ORAL DAILY
Qty: 3 TABLET | Refills: 0 | Status: SHIPPED | OUTPATIENT
Start: 2025-03-25 | End: 2025-03-28

## 2025-03-25 NOTE — PROGRESS NOTES
Mesha Cavanaugh is a 24 year old female  No LMP recorded (lmp unknown). Patient is pregnant.   Chief Complaint   Patient presents with    Prenatal Care     Pt presents for repeat OB visit       OBSTETRICS HISTORY:     OB History    Para Term  AB Living   1             SAB IAB Ectopic Multiple Live Births                  # Outcome Date GA Lbr Gerardo/2nd Weight Sex Type Anes PTL Lv   1 Current                GYNE HISTORY:         No data recorded      Latest Ref Rng & Units 2024    10:20 AM   RECENT PAP RESULTS   INTERPRETATION/RESULT: Negative for intraepithelial lesion or malignancy Negative for intraepithelial lesion or malignancy          MEDICAL HISTORY:     History reviewed. No pertinent past medical history.    SURGICAL HISTORY:     Past Surgical History:   Procedure Laterality Date    Hip surgery         SOCIAL HISTORY:     Social History     Socioeconomic History    Marital status: Single   Tobacco Use    Smoking status: Never     Passive exposure: Never    Smokeless tobacco: Never   Vaping Use    Vaping status: Never Used   Substance and Sexual Activity    Alcohol use: Not Currently     Comment: social    Drug use: Never        FAMILY HISTORY:     History reviewed. No pertinent family history.    MEDICATIONS:       Current Outpatient Medications:     Aspirin 81 MG Oral Cap, Take 2 tablets by mouth daily., Disp: 90 capsule, Rfl: 2    Prenatal Vit-Fe Fumarate-FA (PRENATAL VITAMINS) 28-0.8 MG Oral Tab, Take 1 tablet by mouth daily., Disp: 90 tablet, Rfl: 2    prenatal vitamin with DHA 27-0.8-228 MG Oral Cap, Take 1 capsule by mouth daily., Disp: , Rfl:     Prenatal Vit-Fe Fumarate-FA (PRENATAL VITAMINS) 28-0.8 MG Oral Tab, Take 1 tablet by mouth daily., Disp: 90 tablet, Rfl: 2    azithromycin 500 MG Oral Tab, Take 1 tablet (500 mg total) by mouth daily for 3 days., Disp: 3 tablet, Rfl: 0    Cholecalciferol (VITAMIN D) 50 MCG (2000 UT) Oral Cap, Take 1 capsule (2,000 Units total) by  mouth daily. (Patient not taking: Reported on 3/25/2025), Disp: 90 capsule, Rfl: 2    Aspirin 81 MG Oral Cap, Take 2 tablets by mouth daily. (Patient not taking: Reported on 3/25/2025), Disp: 90 capsule, Rfl: 2    ALLERGIES:     Allergies[1]      REVIEW OF SYSTEMS:     Constitutional:    denies fever / chills  Cardiovascular:  denies chest pain or palpitations  Respiratory:    denies shortness of breath  Gastrointestinal:  denies severe abdominal pain, frequent diarrhea or constipation, nausea / vomiting  Genitourinary:    denies dysuria, bothersome incontinence  Skin/Breast:   denies any breast pain, lumps, or discharge  Neurological:    denies frequent severe headaches  Psychiatric:   denies depression or anxiety, thoughts of harming self or others      PHYSICAL EXAM:   Blood pressure 128/76, weight 248 lb (112.5 kg).  Constitutional:  well developed, well nourished, no distress  Abdomen:   soft, gravid, nontender  Musculoskeletal: no cva tenderness bilaterally  Skin/Hair:  no unusual rashes or bruises  Extremities:  no edema, no cyanosis, non tender bilaterally  Psychiatric:   oriented to time, place, person and situation. Appropriate mood and affect      ASSESSMENT & PLAN:     Mesha was seen today for prenatal care.    Diagnoses and all orders for this visit:    Maternal morbid obesity, antepartum, second trimester (HCC)  -     Aspirin 81 MG Oral Cap; Take 2 tablets by mouth daily.  -     Prenatal Vit-Fe Fumarate-FA (PRENATAL VITAMINS) 28-0.8 MG Oral Tab; Take 1 tablet by mouth daily.  -     HIV Ag/Ab Combo; Future  -     T Pallidum Screening [place]; Future  -     Type and screen    Acute upper respiratory infection  -     azithromycin 500 MG Oral Tab; Take 1 tablet (500 mg total) by mouth daily for 3 days.      Plan: labs w/ rhogam one week  Start asa 2 tabs daily. Did not  last time escribed.   Cough for one week. Yellow sputum. Plan: zpack  Prenatal checkless second trimester counseling has been  completed.  I spent 32 minutes face-to-face with the patient, over half of the time in discussion and counseling of the below factors:  -Abnormal lab values.  I discussed all the current lab values that have returned.  I spent time discussing both normal and any abnormal lab values.  -Selecting a  care provider.  The patient was asked if she has a specific pediatrician that she would like for me to assign.  There is an [place] group that can be assigned if the patient does not have a specific designated provider.  -Prenatal classes.  I discussed with the patient that she can sign up for prenatal classes at [email or web address].  -Signs and symptoms of  labor.  We discussed symptoms such as vaginal bleeding and leakage of fluid vaginally.  These are symptoms that should be brought to the attention of one of the providers.  We discussed round ligament pain and how to help differentiate from  labor pain.  We discussed symptoms and treatment of lower back pain and how to prevent back injury in pregnancy.    FOLLOW-UP     Return in about 1 week (around 2025) for prenatal visit.      Ezequiel Yeh MD  3/25/2025         [1]   Allergies  Allergen Reactions    Latex HIVES

## 2025-03-25 NOTE — PROGRESS NOTES
Outpatient Maternal-Fetal Medicine Follow-Up    Dear Dr. Yeh    Thank you for requesting an ultrasound and maternal-fetal medicine consultation on your patient Mesha Cavanaugh.  As you are aware she is a 24 year old female  with a christie pregnancy and an Estimated Date of Delivery: 25.  She returned to maternal-fetal medicine today for a follow-up visit.  Her history was reviewed from her prior visit and there were no interval changes.    Antepartum Risk Factors  Class III Obesity    PHYSICAL EXAMINATION:  /67   Pulse 93   Wt 249 lb (112.9 kg)   LMP  (LMP Unknown)   BMI 47.05 kg/m²   General: alert and oriented, no acute distress  Abdomen: gravid, soft, non-tender  Extremities: non-tender, no edema    OBSTETRIC ULTRASOUND  The patient had a follow-up growth ultrasound today which revealed normal interval fetal growth.    Ultrasound Findings:  Single IUP in breech presentation.    Placenta is posterior.   A 3 vessel cord is noted.  Cardiac activity is present at 145 bpm  EFW 1118 g ( 2 lb 7 oz); 40%.    MVP is 4.8 cm . OFE 11.5 cm    The fetal measurements are consistent with established EDC. No gross ultrasound evidence of structural abnormalities are seen today. The patient understands that ultrasound cannot rule out all structural and chromosomal abnormalities.     See Imaging Tab For Complete Ultrasound Report  I interpreted the results and reviewed them with the patient.    DISCUSSION  During her visit we discussed and reviewed the following issues:  OBESITY  See prior [facility] notes for a detailed review.    IMPRESSION:  IUP at 28w3d  Obesity    RECOMMENDATIONS:  Continue care with Dr. Yeh  Monthly growth ultrasounds starting at 28 weeks, add BPP to each growth ultrasound at 32 weeks and beyond  Weekly NSTs at 34 weeks  Limit weight gain to 11-20 pounds.  Delivery at 39-40 weeks    Thank you for allowing me to participate in the care of your patient.  Please do not hesitate to contact  me if additional questions or concerns arise.      Adair Cline M.D.    20 minutes spent in review of records, patient consultation, documentation and coordination of care.  The relevant clinical matter(s) are summarized above.     Note to patient and family  The 21st Century Cures Act makes medical notes available to patients in the interest of transparency.  However, please be advised that this is a medical document.  It is intended as [id number] communication.  It is written and medical language may contain abbreviations or verbiage that are technical and unfamiliar.  It may appear blunt or direct.  Medical documents are intended to carry relevant information, facts as evident, and the clinical opinion of the practitioner.

## 2025-03-28 ENCOUNTER — OFFICE VISIT (OUTPATIENT)
Age: 25
End: 2025-03-28
Attending: OBSTETRICS & GYNECOLOGY
Payer: MEDICAID

## 2025-03-28 VITALS
TEMPERATURE: 99 F | DIASTOLIC BLOOD PRESSURE: 81 MMHG | SYSTOLIC BLOOD PRESSURE: 130 MMHG | RESPIRATION RATE: 18 BRPM | WEIGHT: 249.81 LBS | BODY MASS INDEX: 47 KG/M2 | HEART RATE: 99 BPM | OXYGEN SATURATION: 98 %

## 2025-03-28 DIAGNOSIS — O99.012 ANEMIA COMPLICATING PREGNANCY IN SECOND TRIMESTER (HCC): Primary | ICD-10-CM

## 2025-03-28 NOTE — PROGRESS NOTES
Patient arrives to infusion for 2/3 Venofer . Patient denies any issues or concerns.      Ordering Provider: Ezequiel Yeh MD  Order Exp: 1 dose remaining in order     Patient appeared to tolerate infusion without difficulty or complaint. No s/s of adverse reaction noted. VSS post infusion.      Reviewed next apt date/time: 4/4 at 12pm     Education Record  Learner:  Patient  Disease / Diagnosis: Anemia in pregnancy  Barriers / Limitations:  None  Method:  Discussion and Reinforcement  General Topics:  Medication, Precautions, Side effects and symptom management, and Plan of care reviewed  Outcome:  Shows understanding

## 2025-04-01 ENCOUNTER — HOSPITAL ENCOUNTER (OUTPATIENT)
Dept: PERINATAL CARE | Facility: HOSPITAL | Age: 25
Discharge: HOME | End: 2025-04-01
Attending: OBSTETRICS & GYNECOLOGY
Payer: MEDICAID

## 2025-04-01 VITALS
DIASTOLIC BLOOD PRESSURE: 67 MMHG | BODY MASS INDEX: 47 KG/M2 | HEART RATE: 93 BPM | SYSTOLIC BLOOD PRESSURE: 115 MMHG | WEIGHT: 249 LBS

## 2025-04-01 DIAGNOSIS — E66.01 MATERNAL MORBID OBESITY, ANTEPARTUM, SECOND TRIMESTER (HCC): ICD-10-CM

## 2025-04-01 DIAGNOSIS — E66.01 MATERNAL MORBID OBESITY, ANTEPARTUM, SECOND TRIMESTER (HCC): Primary | ICD-10-CM

## 2025-04-01 DIAGNOSIS — E66.01 MATERNAL MORBID OBESITY IN THIRD TRIMESTER, ANTEPARTUM (HCC): ICD-10-CM

## 2025-04-01 DIAGNOSIS — O99.213 MATERNAL MORBID OBESITY IN THIRD TRIMESTER, ANTEPARTUM (HCC): ICD-10-CM

## 2025-04-01 DIAGNOSIS — O99.212 MATERNAL MORBID OBESITY, ANTEPARTUM, SECOND TRIMESTER (HCC): ICD-10-CM

## 2025-04-01 DIAGNOSIS — O99.212 MATERNAL MORBID OBESITY, ANTEPARTUM, SECOND TRIMESTER (HCC): Primary | ICD-10-CM

## 2025-04-01 PROCEDURE — 76816 OB US FOLLOW-UP PER FETUS: CPT | Performed by: OBSTETRICS & GYNECOLOGY

## 2025-04-04 ENCOUNTER — OFFICE VISIT (OUTPATIENT)
Age: 25
End: 2025-04-04
Attending: OBSTETRICS & GYNECOLOGY
Payer: MEDICAID

## 2025-04-04 VITALS
SYSTOLIC BLOOD PRESSURE: 129 MMHG | TEMPERATURE: 98 F | OXYGEN SATURATION: 98 % | HEART RATE: 93 BPM | RESPIRATION RATE: 16 BRPM | DIASTOLIC BLOOD PRESSURE: 79 MMHG

## 2025-04-04 DIAGNOSIS — E66.01 MATERNAL MORBID OBESITY, ANTEPARTUM, SECOND TRIMESTER (HCC): ICD-10-CM

## 2025-04-04 DIAGNOSIS — O99.212 MATERNAL MORBID OBESITY, ANTEPARTUM, SECOND TRIMESTER (HCC): ICD-10-CM

## 2025-04-04 DIAGNOSIS — O99.012 ANEMIA COMPLICATING PREGNANCY IN SECOND TRIMESTER (HCC): Primary | ICD-10-CM

## 2025-04-04 LAB — T PALLIDUM AB SER QL IA: NONREACTIVE

## 2025-04-04 NOTE — PROGRESS NOTES
Pt here for venofer 300mg      Ordering Provider: Dr. Yeh  Order Exp: 3 of 3     Pt tolerated infusion without difficulty or complaint.   Post vs wnl. Discharged stable.      Education Record  Learner:  Patient  Disease / Diagnosis: HEAVEN  Barriers / Limitations:  None  Method:  Discussion  General Topics:  Plan of care reviewed  Outcome:  Shows understanding

## 2025-04-08 ENCOUNTER — TELEPHONE (OUTPATIENT)
Dept: OBGYN CLINIC | Facility: CLINIC | Age: 25
End: 2025-04-08

## 2025-04-08 NOTE — TELEPHONE ENCOUNTER
Patient verified name and     Patient requesting to speak with Dr. Yeh, would not disclose more information. States she has a personal question she wishes to discuss.    Patient also inquiring about Rhogam, informed patient Type and Screen still needs to be completed. Voiced understanding.

## 2025-04-08 NOTE — TELEPHONE ENCOUNTER
Patient called to speak with Dr. Yeh regarding appointment for today (cancelled) and shot to be given and that patient has moved. Please call.

## 2025-04-09 ENCOUNTER — TELEPHONE (OUTPATIENT)
Dept: OBGYN CLINIC | Facility: CLINIC | Age: 25
End: 2025-04-09

## 2025-04-09 NOTE — TELEPHONE ENCOUNTER
I called pt. She moved to [place]. She can remain with the practice. I rec: type and screen and then rhogam . She agreed

## 2025-04-15 ENCOUNTER — ROUTINE PRENATAL (OUTPATIENT)
Dept: OBGYN CLINIC | Facility: CLINIC | Age: 25
End: 2025-04-15

## 2025-04-15 VITALS
HEART RATE: 98 BPM | DIASTOLIC BLOOD PRESSURE: 82 MMHG | BODY MASS INDEX: 50 KG/M2 | WEIGHT: 265 LBS | SYSTOLIC BLOOD PRESSURE: 125 MMHG

## 2025-04-15 DIAGNOSIS — E66.01 MATERNAL MORBID OBESITY IN THIRD TRIMESTER, ANTEPARTUM (HCC): Primary | ICD-10-CM

## 2025-04-15 DIAGNOSIS — O99.213 MATERNAL MORBID OBESITY IN THIRD TRIMESTER, ANTEPARTUM (HCC): Primary | ICD-10-CM

## 2025-04-15 PROCEDURE — 99214 OFFICE O/P EST MOD 30 MIN: CPT | Performed by: OBSTETRICS & GYNECOLOGY

## 2025-04-15 RX ORDER — ASPIRIN 81 MG/1
162 TABLET, CHEWABLE ORAL DAILY
Qty: 60 TABLET | Refills: 5 | Status: SHIPPED | OUTPATIENT
Start: 2025-04-15

## 2025-04-15 RX ORDER — ASPIRIN 81 MG/1
81 TABLET, CHEWABLE ORAL ONCE
Status: SHIPPED | OUTPATIENT
Start: 2025-04-15

## 2025-04-15 NOTE — PROGRESS NOTES
Mesha Cavanaugh is a 24 year old female  No LMP recorded (lmp unknown). Patient is pregnant.   Chief Complaint   Patient presents with    Prenatal Care     Pt presents for repeat OB visit        OBSTETRICS HISTORY:     OB History    Para Term  AB Living   1        SAB IAB Ectopic Multiple Live Births             # Outcome Date GA Lbr Gerardo/2nd Weight Sex Type Anes PTL Lv   1 Current                GYNE HISTORY:         No data recorded      Latest Ref Rng & Units 2024    10:20 AM   RECENT PAP RESULTS   INTERPRETATION/RESULT: Negative for intraepithelial lesion or malignancy Negative for intraepithelial lesion or malignancy          MEDICAL HISTORY:     Past Medical History[1]    SURGICAL HISTORY:     Past Surgical History[2]    SOCIAL HISTORY:     Short Social Hx on File[3]     FAMILY HISTORY:     Family History[4]    MEDICATIONS:     Medications - Current[5]    ALLERGIES:     Allergies[6]      REVIEW OF SYSTEMS:     Constitutional:    denies fever / chills  Cardiovascular:  denies chest pain or palpitations  Respiratory:    denies shortness of breath  Gastrointestinal:  denies severe abdominal pain, frequent diarrhea or constipation, nausea / vomiting  Genitourinary:    denies dysuria, bothersome incontinence  Skin/Breast:   denies any breast pain, lumps, or discharge  Neurological:    denies frequent severe headaches  Psychiatric:   denies depression or anxiety, thoughts of harming self or others      PHYSICAL EXAM:   Blood pressure 125/82, pulse 98, weight 265 lb (120.2 kg).  Constitutional:  well developed, well nourished, no distress  Abdomen:   soft, gravid, nontender  Musculoskeletal: no cva tenderness bilaterally  Skin/Hair:  no unusual rashes or bruises  Extremities:  no edema, no cyanosis, non tender bilaterally  Psychiatric:   oriented to time, place, person and situation. Appropriate mood and affect      ASSESSMENT & PLAN:     Mesha was seen today for prenatal  care.    Diagnoses and all orders for this visit:    Maternal morbid obesity in third trimester, antepartum (HCC)  -     aspirin chewable tab 81 mg  -     CBC, Platelet; No Differential; Future  -     Ferritin; Future  -     Type and screen      Needs rhogam  Fetal kick counts discussed with  patient, labor precautions given.  Prenatal checklist third trimester counseling has been completed. I spent 33 minutes face-to-face with the patient, over half of the time in discussion and counseling of the below factors:  -Anesthesia/analgesia plans.  IV pain relief drugs d/w pt and that substantial relief of labor pain is generally not achieved with this method, and that these drugs can cross the placenta which can decreased fetal heart rate variability in utero and increased  respiratory depression and neurobehavioral changes. Up to two-thirds of women report moderate or severe pain one or two hours after administration. Epidural may be initiated at any stage of labor and can be maintained by rebolus. Efficacy dw pt and hypotension prevention with fluid bolus d/w pt  -Circumcision. D/w pt that we can most likely perform in the hospital before discharge. The rate of complications during and in the first month is approximately 0.2% including bleeding, infection, urethral complications. The decision is optional and benefits from male circumcision include prevention of : urinary tract infections, acquisition of HIV, some STD's, and penile cancer.   -Breast of bottle feeding.  Breastfeeding is strongly recommended because of direct benefits to the infant's nutrition, GI function, host defense including against otitis media and pneumonia during the first few years of life, and psychological well-being. Moderate-quality evidence exists for prevention of type 1 DM, inflammatory bowel disease, and wheezing in young children.  - education ( screening, jaundice, SIDS prevention, car seat, vit K, erythromycin,  routine care) also d/w pt.  -Family medical leave or disability forms. Our office will be happy to assist in addressing these forms.  -GBBS. This bacterial normally colonizes the GI and genital tracts of 15 to 40% of pregnant women. Colonization is asymptomatic, but if left untreated in the event of a positve rectovaginal culture (done at 36 weeks plus) can cause infection in neonates and infants less than 90 days of age. If culture positive, the administration of antibiotics in labor is associated with a 69% reduction in early onset GBBS disease compared with no prevention strategy at all. GBBS in the urine or history of infant affected by early onset GBS disease are examples of automatic treatment prophylaxis during labor and the 36 week plus culture is not needed.   -Tdap vaccine.  Ideally give b/w 27 and 36 wks EGA in each pregnancy to help provide protection against pertussis.   -Postpartum family planning/contraception/tubal sterilization.  D/w pt and all questions answered  -Birth plan.  Birth plans will be reviewed with the patient if they elect to complete one. This is not a requirement. Support persons appear to have both psychological and medical benefits.   -Labor instruction info sheet.  Labor signs and symptoms discussed with patient. Entry into hospital when labor ensues d/w pt. Some of the signs might be a gush or trickle of amniotic fluid, vaginal bleeding, regular contractions.   -Emergency blood transfusion. If deemed clinically necessary and with the patient's consent, she does accept blood transfusion. Risks and benefits of blood transfusion d/w pt:  -Fetal movement monitoring.  Patient instructed to either contact provider of come straight to labor and delivery without contacting the provider if she perceives a significant and persistant reduction in fetal movement and never to wait longer than two hours if there is absent fetal movement. If the patient is in doubt, she can come to hospital if  she counts fewer than 10 kicks over two consecutive hours when lying on her side.  -Labor signs.  If the patient is experiencing uterine tightening (usually lasting 30-60 seconds) every 10 minutes or more she may contact the provider or come to labor and delivery without calling the provider. Patient instructed to also come to labor and delivery if vaginal bleeding or leakage of fluid vaginally.   -Signs and symptoms of pregnancy induced hypertension.  New onset of hypertension after 20 weeks gestation with or wihout protein in the urine are evaluated for preeclamsia. Symptoms include persistent headache, especially if not relieved by tylenol. Other visual symptoms include scotomata, photophobia, blurred vision, or temporary blindness. Upper abdominal or epigastric pain and altered mental status, or confusion are other symptoms.  -Postpartum depression. Patient asked to inform the provider is suspected. The disorder is suspected in women who manifest anxiety about the health of the infant, concern about their ability to care for the infant, despondency, lack of interest in the infant's activities, and substance abuse disorders.     Pt stopped working b/c tired and legs swell by end of day.       FOLLOW-UP     Return in about 2 weeks (around 4/29/2025) for prenatal visit.      Ezequiel Yeh MD  4/15/2025         [1] History reviewed. No pertinent past medical history.  [2]   Past Surgical History:  Procedure Laterality Date    Hip surgery  2010   [3]   Social History  Socioeconomic History    Marital status: Single   Tobacco Use    Smoking status: Never     Passive exposure: Never    Smokeless tobacco: Never   Vaping Use    Vaping status: Never Used   Substance and Sexual Activity    Alcohol use: Not Currently     Comment: social    Drug use: Never   [4] History reviewed. No pertinent family history.  [5]   Current Outpatient Medications:     Aspirin 81 MG Oral Cap, Take 2 tablets by mouth daily., Disp: 90  capsule, Rfl: 2    Prenatal Vit-Fe Fumarate-FA (PRENATAL VITAMINS) 28-0.8 MG Oral Tab, Take 1 tablet by mouth daily., Disp: 90 tablet, Rfl: 2    prenatal vitamin with DHA 27-0.8-228 MG Oral Cap, Take 1 capsule by mouth daily., Disp: , Rfl:     Cholecalciferol (VITAMIN D) 50 MCG (2000 UT) Oral Cap, Take 1 capsule (2,000 Units total) by mouth daily., Disp: 90 capsule, Rfl: 2    Aspirin 81 MG Oral Cap, Take 2 tablets by mouth daily., Disp: 90 capsule, Rfl: 2  [6]   Allergies  Allergen Reactions    Latex HIVES

## 2025-04-22 NOTE — PROGRESS NOTES
Outpatient Maternal-Fetal Medicine Follow-Up     Dear Dr. Yeh     Thank you for requesting an ultrasound and maternal-fetal medicine consultation on your patient Mesha Cavanaugh.  As you are aware she is a 24 year old female  with a christie pregnancy and an Estimated Date of Delivery: 25.  She returned to maternal-fetal medicine today for a follow-up visit.  Her history was reviewed from her prior visit and there were no interval changes.     Antepartum Risk Factors  Class III Obesity     PHYSICAL EXAMINATION:  /82   Pulse 106   Wt 273 lb (123.8 kg)   LMP  (LMP Unknown)   BMI 51.58 kg/m²    General: alert and oriented, no acute distress  Abdomen: gravid, soft, non-tender  Extremities: non-tender, no edema     OBSTETRIC ULTRASOUND    Ultrasound Findings:  Single IUP in breech presentation.    Placenta is posterior.   A 3 vessel cord is noted.  Cardiac activity is present at 151 bpm  EFW 1874 g ( 4 lb 2 oz); 39%.    OFE is  9.4 cm.  MVP is 4 cm  BPP is 8/8.     The fetal measurements are consistent with established EDC. No gross ultrasound evidence of structural abnormalities are seen today. The patient understands that ultrasound cannot rule out all structural and chromosomal abnormalities.     See Imaging Tab For Complete Ultrasound Report  I interpreted the results and reviewed them with the patient.     DISCUSSION  During her visit we discussed and reviewed the following issues:  OBESITY  See prior MFM notes for a detailed review.     IMPRESSION:  IUP at 32w3d  Obesity     RECOMMENDATIONS:  Continue care with Dr. Yeh  Monthly growth ultrasounds starting at 28 weeks, add BPP to each growth ultrasound at 32 weeks and beyond  Weekly NSTs at 34 weeks  Limit weight gain to 11-20 pounds.  Delivery at 39-40 weeks     Thank you for allowing me to participate in the care of your patient.  Please do not hesitate to contact me if additional questions or concerns arise.       Adair Cline,  M.D.     20 minutes spent in review of records, patient consultation, documentation and coordination of care.  The relevant clinical matter(s) are summarized above.      Note to patient and family  The 21st Century Cures Act makes medical notes available to patients in the interest of transparency.  However, please be advised that this is a medical document.  It is intended as [id number]-yj-njob communication.  It is written and medical language may contain abbreviations or verbiage that are technical and unfamiliar.  It may appear blunt or direct.  Medical documents are intended to carry relevant information, facts as evident, and the clinical opinion of the practitioner.

## 2025-04-29 ENCOUNTER — HOSPITAL ENCOUNTER (OUTPATIENT)
Dept: PERINATAL CARE | Facility: HOSPITAL | Age: 25
Discharge: HOME | End: 2025-04-29
Attending: OBSTETRICS & GYNECOLOGY
Payer: MEDICAID

## 2025-04-29 VITALS
WEIGHT: 273 LBS | BODY MASS INDEX: 52 KG/M2 | DIASTOLIC BLOOD PRESSURE: 82 MMHG | SYSTOLIC BLOOD PRESSURE: 128 MMHG | HEART RATE: 106 BPM

## 2025-04-29 DIAGNOSIS — O99.213 MATERNAL MORBID OBESITY IN THIRD TRIMESTER, ANTEPARTUM (HCC): Primary | ICD-10-CM

## 2025-04-29 DIAGNOSIS — E66.01 MATERNAL MORBID OBESITY IN THIRD TRIMESTER, ANTEPARTUM (HCC): Primary | ICD-10-CM

## 2025-04-29 DIAGNOSIS — E66.01 MATERNAL MORBID OBESITY IN THIRD TRIMESTER, ANTEPARTUM (HCC): ICD-10-CM

## 2025-04-29 DIAGNOSIS — O99.213 MATERNAL MORBID OBESITY IN THIRD TRIMESTER, ANTEPARTUM (HCC): ICD-10-CM

## 2025-04-29 PROCEDURE — 76819 FETAL BIOPHYS PROFIL W/O NST: CPT

## 2025-04-29 PROCEDURE — 76816 OB US FOLLOW-UP PER FETUS: CPT | Performed by: OBSTETRICS & GYNECOLOGY

## 2025-05-13 ENCOUNTER — ROUTINE PRENATAL (OUTPATIENT)
Dept: OBGYN CLINIC | Facility: CLINIC | Age: 25
End: 2025-05-13

## 2025-05-13 ENCOUNTER — HOSPITAL ENCOUNTER (OUTPATIENT)
Dept: PERINATAL CARE | Facility: HOSPITAL | Age: 25
Discharge: HOME | End: 2025-05-13
Attending: OBSTETRICS & GYNECOLOGY
Payer: MEDICAID

## 2025-05-13 VITALS — SYSTOLIC BLOOD PRESSURE: 121 MMHG | DIASTOLIC BLOOD PRESSURE: 69 MMHG

## 2025-05-13 VITALS — WEIGHT: 281 LBS | SYSTOLIC BLOOD PRESSURE: 135 MMHG | DIASTOLIC BLOOD PRESSURE: 75 MMHG | BODY MASS INDEX: 53 KG/M2

## 2025-05-13 DIAGNOSIS — Z67.91 RH NEGATIVE STATE IN ANTEPARTUM PERIOD, SECOND TRIMESTER (HCC): ICD-10-CM

## 2025-05-13 DIAGNOSIS — E66.01 MATERNAL MORBID OBESITY IN THIRD TRIMESTER, ANTEPARTUM (HCC): Primary | ICD-10-CM

## 2025-05-13 DIAGNOSIS — O99.213 MATERNAL MORBID OBESITY IN THIRD TRIMESTER, ANTEPARTUM (HCC): Primary | ICD-10-CM

## 2025-05-13 DIAGNOSIS — O26.892 RH NEGATIVE STATE IN ANTEPARTUM PERIOD, SECOND TRIMESTER (HCC): ICD-10-CM

## 2025-05-13 PROCEDURE — 59025 FETAL NON-STRESS TEST: CPT

## 2025-05-13 PROCEDURE — 76815 OB US LIMITED FETUS(S): CPT | Performed by: OBSTETRICS & GYNECOLOGY

## 2025-05-13 PROCEDURE — 99213 OFFICE O/P EST LOW 20 MIN: CPT | Performed by: OBSTETRICS & GYNECOLOGY

## 2025-05-13 NOTE — PROGRESS NOTES
Mesha Cavanaugh is a 25 year old female  No LMP recorded (lmp unknown). Patient is pregnant.   Chief Complaint   Patient presents with    Prenatal Care       OBSTETRICS HISTORY:     OB History    Para Term  AB Living   1        SAB IAB Ectopic Multiple Live Births             # Outcome Date GA Lbr Gerardo/2nd Weight Sex Type Anes PTL Lv   1 Current                GYNE HISTORY:         No data recorded      Latest Ref Rng & Units 2024    10:20 AM   RECENT PAP RESULTS   INTERPRETATION/RESULT: Negative for intraepithelial lesion or malignancy Negative for intraepithelial lesion or malignancy          MEDICAL HISTORY:     Past Medical History[1]    SURGICAL HISTORY:     Past Surgical History[2]    SOCIAL HISTORY:     Short Social Hx on File[3]     FAMILY HISTORY:     Family History[4]    MEDICATIONS:     Medications - Current[5]    ALLERGIES:     Allergies[6]      REVIEW OF SYSTEMS:     Constitutional:    denies fever / chills  Cardiovascular:  denies chest pain or palpitations  Respiratory:    denies shortness of breath  Gastrointestinal:  denies severe abdominal pain, frequent diarrhea or constipation, nausea / vomiting  Genitourinary:    denies dysuria, bothersome incontinence  Skin/Breast:   denies any breast pain, lumps, or discharge  Neurological:    denies frequent severe headaches  Psychiatric:   denies depression or anxiety, thoughts of harming self or others      PHYSICAL EXAM:   Blood pressure 135/75, weight 281 lb (127.5 kg).  Constitutional:  well developed, well nourished, no distress  Abdomen:   soft, gravid, nontender  Musculoskeletal: no cva tenderness bilaterally  Skin/Hair:  no unusual rashes or bruises  Extremities:  no edema, no cyanosis, non tender bilaterally  Psychiatric:   oriented to time, place, person and situation. Appropriate mood and affect      ASSESSMENT & PLAN:     Mesha was seen today for prenatal care.    Diagnoses and all orders for this visit:    Maternal  morbid obesity in third trimester, antepartum (HCC)  -     [facility]  OB  LIMITED ([id number])  Eleanor for obesity 3 wnl today, 10.12  Rh negative state in antepartum period, second trimester (HCC)  I discussed with the patient the importance of getting her labs done.  She should get a type and screen.  She states she will go soon as possible.  I told her that 28 weeks was the ideal time and that as soon as possible would be good to prevent fetal anemia and then expectancy.  Importance of getting this done discussed with the patient  Breech presentation, single or unspecified fetus (HCC)    I discussed with the patient the implications of breech presentation which processes at the end could lead to external cephalic version and an increased risk for  due to the breech presentation.  Will continue to follow.  I reviewed the 2025 ultrasound from maternal-fetal medicine and the baby was also breech.      FOLLOW-UP     No follow-ups on file.      Ezequiel Yeh MD  2025         [1] History reviewed. No pertinent past medical history.  [2]   Past Surgical History:  Procedure Laterality Date    Hip surgery     [3]   Social History  Socioeconomic History    Marital status: Single   Tobacco Use    Smoking status: Never     Passive exposure: Never    Smokeless tobacco: Never   Vaping Use    Vaping status: Never Used   Substance and Sexual Activity    Alcohol use: Not Currently     Comment: social    Drug use: Never   [4] History reviewed. No pertinent family history.  [5]   Current Outpatient Medications:     aspirin (ASPIRIN 81) 81 MG Oral Chew Tab, Chew 2 tablets (162 mg total) by mouth daily., Disp: 60 tablet, Rfl: 5    Aspirin 81 MG Oral Cap, Take 2 tablets by mouth daily., Disp: 90 capsule, Rfl: 2    Prenatal Vit-Fe Fumarate-FA (PRENATAL VITAMINS) 28-0.8 MG Oral Tab, Take 1 tablet by mouth daily., Disp: 90 tablet, Rfl: 2    prenatal vitamin with DHA 27-0.8-228 MG Oral Cap, Take 1 capsule by  mouth daily., Disp: , Rfl:     Cholecalciferol (VITAMIN D) 50 MCG (2000 UT) Oral Cap, Take 1 capsule (2,000 Units total) by mouth daily., Disp: 90 capsule, Rfl: 2    Aspirin 81 MG Oral Cap, Take 2 tablets by mouth daily., Disp: 90 capsule, Rfl: 2  [6]   Allergies  Allergen Reactions    Latex HIVES

## 2025-05-19 ENCOUNTER — NST DOCUMENTATION (OUTPATIENT)
Dept: OBGYN CLINIC | Facility: CLINIC | Age: 25
End: 2025-05-19

## 2025-05-19 DIAGNOSIS — E66.01 MATERNAL MORBID OBESITY IN THIRD TRIMESTER, ANTEPARTUM (HCC): Primary | ICD-10-CM

## 2025-05-19 DIAGNOSIS — O99.213 MATERNAL MORBID OBESITY IN THIRD TRIMESTER, ANTEPARTUM (HCC): Primary | ICD-10-CM

## 2025-05-19 NOTE — NST
Nonstress Test   Patient: Mesha Cavanaugh    Gestation: 35w2d    Diagnosis from order:      Problem List: Problem List[1]    NST:        2025   NST DOCUMENTATION   Variability 6-25 BPM   Accelerations Yes   Decelerations None   Baseline 135 BPM   Uterine Irritability No   Contractions Not present   Acoustic Stimulator No   Nonstress Test Interpretation Reactive   NST Completed by Concha SARMIENTO   Disposition OB appointment    Testing Plan Weekly NST   Provider Notified Maxx CASTELLANOS         I agree with the above evaluation. NST completed.  Kylah Lilly MD  2025  11:30 AM             [1]   Patient Active Problem List  Diagnosis    Rh negative state in antepartum period, second trimester (HCC)    History of hip surgery    Maternal morbid obesity in third trimester, antepartum ([facility])    Anemia complicating pregnancy in second trimester ([facility])    Breech presentation ([facility])

## 2025-05-20 ENCOUNTER — TELEPHONE (OUTPATIENT)
Dept: OBGYN CLINIC | Facility: CLINIC | Age: 25
End: 2025-05-20

## 2025-05-20 NOTE — TELEPHONE ENCOUNTER
Patient states she delivered yesterday at [facility] in [place], wanted to speak with Dr. Yeh

## 2025-05-21 ENCOUNTER — TELEPHONE (OUTPATIENT)
Dept: OBGYN CLINIC | Facility: CLINIC | Age: 25
End: 2025-05-21

## 2025-05-27 ENCOUNTER — TELEPHONE (OUTPATIENT)
Dept: PERINATAL CARE | Facility: HOSPITAL | Age: 25
End: 2025-05-27

## 2025-05-27 ENCOUNTER — APPOINTMENT (OUTPATIENT)
Dept: PERINATAL CARE | Facility: HOSPITAL | Age: 25
End: 2025-05-27
Attending: OBSTETRICS & GYNECOLOGY
Payer: MEDICAID

## 2025-05-27 ENCOUNTER — HOSPITAL ENCOUNTER (OUTPATIENT)
Dept: PERINATAL CARE | Facility: HOSPITAL | Age: 25
Discharge: HOME | End: 2025-05-27
Attending: OBSTETRICS & GYNECOLOGY
Payer: MEDICAID

## 2025-05-27 DIAGNOSIS — E66.01 MATERNAL MORBID OBESITY IN THIRD TRIMESTER, ANTEPARTUM (HCC): Primary | ICD-10-CM

## 2025-05-27 DIAGNOSIS — E66.01 MATERNAL MORBID OBESITY IN THIRD TRIMESTER, ANTEPARTUM (HCC): ICD-10-CM

## 2025-05-27 DIAGNOSIS — O99.213 MATERNAL MORBID OBESITY IN THIRD TRIMESTER, ANTEPARTUM (HCC): ICD-10-CM

## 2025-05-27 DIAGNOSIS — O99.213 MATERNAL MORBID OBESITY IN THIRD TRIMESTER, ANTEPARTUM (HCC): Primary | ICD-10-CM

## 2025-05-27 DIAGNOSIS — O99.012 ANEMIA COMPLICATING PREGNANCY IN SECOND TRIMESTER (HCC): ICD-10-CM

## 2025-05-27 NOTE — TELEPHONE ENCOUNTER
PATIENT HAD A 9:00 AM GROWTH ULTRASOUND SCHEDULED TODAY, TUESDAY 5/27/25. PATIENT HAD AN 8:15 AM APPOINTMENT WITH DR CHENEY BEFORE THIS APPOINTMENT. CALLED DR CHENEY'S OFFICE. THEY SAID SHE NO SHOWED FOR HER APPOINTMENT TODAY. TRIED CALLING PATIENT. NO ANSWER. LEFT MESSAGE SHE MISSED HER APPOINTMENT TODAY. PLEASE CALL THE OFFICE [phone] IF SHE WOULD LIKE TO RESCHEDULE.

## (undated) NOTE — LETTER
1/29/2025              Mesha Cavanaugh        [address] [address]       To whom it may concern,    The above named patient is under my care, please excuse her from work on 1/27/25, she is cleared to return back to work.    Sincerely,    Guerrero Guevara MD

## (undated) NOTE — LETTER
Date & Time: 10/28/2022, 12:57 AM  Patient: Oliverio Brar  Encounter Provider(s):    TY Coles       To Whom It May Concern:    Oliverio Brar was seen and treated in our department on 10/27/2022. She should not return to work until 11/01/2022.     If you have any questions or concerns, please do not hesitate to call.        _____________________________  Physician/APC Signature